# Patient Record
Sex: MALE | Race: WHITE | NOT HISPANIC OR LATINO | Employment: FULL TIME | URBAN - METROPOLITAN AREA
[De-identification: names, ages, dates, MRNs, and addresses within clinical notes are randomized per-mention and may not be internally consistent; named-entity substitution may affect disease eponyms.]

---

## 2019-02-11 ENCOUNTER — OFFICE VISIT (OUTPATIENT)
Dept: INTERNAL MEDICINE | Facility: CLINIC | Age: 49
End: 2019-02-11

## 2019-02-11 VITALS
DIASTOLIC BLOOD PRESSURE: 90 MMHG | SYSTOLIC BLOOD PRESSURE: 138 MMHG | HEART RATE: 62 BPM | BODY MASS INDEX: 24.08 KG/M2 | HEIGHT: 71 IN | WEIGHT: 172 LBS | TEMPERATURE: 98.2 F

## 2019-02-11 DIAGNOSIS — J45.30 MILD PERSISTENT ASTHMATIC BRONCHITIS WITHOUT COMPLICATION: ICD-10-CM

## 2019-02-11 DIAGNOSIS — R07.89 OTHER CHEST PAIN: Primary | ICD-10-CM

## 2019-02-11 PROBLEM — J30.89 PERENNIAL ALLERGIC RHINITIS WITH SEASONAL VARIATION: Status: ACTIVE | Noted: 2017-08-29

## 2019-02-11 PROBLEM — L08.1 ERYTHRASMA: Status: ACTIVE | Noted: 2017-08-29

## 2019-02-11 PROBLEM — J30.2 PERENNIAL ALLERGIC RHINITIS WITH SEASONAL VARIATION: Status: ACTIVE | Noted: 2017-08-29

## 2019-02-11 PROBLEM — J45.20 MILD INTERMITTENT ASTHMA: Status: ACTIVE | Noted: 2018-10-15

## 2019-02-11 PROBLEM — G43.909 MIGRAINE: Status: ACTIVE | Noted: 2019-02-11

## 2019-02-11 PROCEDURE — 99214 OFFICE O/P EST MOD 30 MIN: CPT | Performed by: PHYSICIAN ASSISTANT

## 2019-02-11 PROCEDURE — 93000 ELECTROCARDIOGRAM COMPLETE: CPT | Performed by: PHYSICIAN ASSISTANT

## 2019-02-11 RX ORDER — ALBUTEROL SULFATE 90 UG/1
2 AEROSOL, METERED RESPIRATORY (INHALATION) EVERY 4 HOURS PRN
COMMUNITY
End: 2019-08-21 | Stop reason: SDUPTHER

## 2019-02-11 RX ORDER — MONTELUKAST SODIUM 10 MG/1
10 TABLET ORAL NIGHTLY
Refills: 1 | COMMUNITY
Start: 2019-02-04 | End: 2019-12-10 | Stop reason: SDUPTHER

## 2019-02-11 RX ORDER — LOSARTAN POTASSIUM 25 MG/1
25 TABLET ORAL DAILY
COMMUNITY
End: 2020-04-09 | Stop reason: SDUPTHER

## 2019-02-11 RX ORDER — AZITHROMYCIN 250 MG/1
TABLET, FILM COATED ORAL
Qty: 6 TABLET | Refills: 0 | Status: SHIPPED | OUTPATIENT
Start: 2019-02-11 | End: 2019-03-28

## 2019-02-11 NOTE — PROGRESS NOTES
Patient Care Team:  Chel Blakely MD as PCP - General (Internal Medicine)    Chief Complaint;:   Chief Complaint   Patient presents with   • URI     cough-non productive, fatigue x 1 week        Subjective     HPI    Past Medical History:   Diagnosis Date   • Allergic rhinitis    • Childhood asthma     childhood   • Depression    • GERD (gastroesophageal reflux disease)    • Hypercholesteremia    • Hypertension    • Migraine        Social History     Socioeconomic History   • Marital status:      Spouse name: Not on file   • Number of children: Not on file   • Years of education: Not on file   • Highest education level: Not on file   Social Needs   • Financial resource strain: Not on file   • Food insecurity - worry: Not on file   • Food insecurity - inability: Not on file   • Transportation needs - medical: Not on file   • Transportation needs - non-medical: Not on file   Occupational History   • Not on file   Tobacco Use   • Smoking status: Never Smoker   • Smokeless tobacco: Never Used   Substance and Sexual Activity   • Alcohol use: Yes     Alcohol/week: 1.2 oz     Types: 2 Glasses of wine per week   • Drug use: Defer   • Sexual activity: Defer   Other Topics Concern   • Not on file   Social History Narrative   • Not on file       Allergies   Allergen Reactions   • Effexor [Venlafaxine] Paresthesia   • Remeron [Mirtazapine] Unknown (See Comments)     Unknown reaction       Review of Systems:     Review of Systems   Constitutional: Negative for chills, fatigue and fever.   HENT: Positive for congestion and sinus pressure. Negative for ear pain.    Respiratory: Positive for cough, shortness of breath and wheezing. Negative for chest tightness.    Cardiovascular: Negative for chest pain and palpitations.   Gastrointestinal: Negative for abdominal pain, blood in stool and constipation.   Skin: Negative for color change.   Allergic/Immunologic: Negative for environmental allergies.   Neurological:  "Positive for dizziness and headache. Negative for speech difficulty.   Psychiatric/Behavioral: Negative for decreased concentration. The patient is not nervous/anxious.        Vital Signs  Vitals:    02/11/19 1312   BP: 138/90   BP Location: Left arm   Patient Position: Sitting   Cuff Size: Adult   Pulse: 62   Temp: 98.2 °F (36.8 °C)   TempSrc: Temporal   Weight: 78 kg (172 lb)   Height: 180.3 cm (71\")   PainSc: 0-No pain         Current Outpatient Medications:   •  albuterol sulfate  (90 Base) MCG/ACT inhaler, Inhale 2 puffs Every 4 (Four) Hours As Needed for Wheezing., Disp: , Rfl:   •  losartan (COZAAR) 25 MG tablet, Take 25 mg by mouth Daily., Disp: , Rfl:   •  montelukast (SINGULAIR) 10 MG tablet, , Disp: , Rfl: 1    Physical Exam:    Physical Exam    Procedures      Assessment/Plan   Problem List Items Addressed This Visit     None        There are no Patient Instructions on file for this visit.    Plan of care reviewed with patient at the conclusion of today's visit. Education was provided regarding diagnosis, management, and any prescribed or recommended OTC medications.Patient verbalizes understanding of and agreement with management plan.     Clarice Champion PA-C  "

## 2019-02-11 NOTE — PROGRESS NOTES
"Patient Care Team:  Cehl Blakely MD as PCP - General (Internal Medicine)    Chief Complaint;:   Chief Complaint   Patient presents with   • URI     cough-non productive, fatigue x 1 week        Subjective     HPI  48-year-old male presents to the office today complaining of a one-week history of chest pressure and cough.  He states that the chest pressure has gotten significantly worse, \"feels like an elephant sitting on my chest\".  He's also had some associated left mid back discomfort.  He has been coughing and has had some wheezing.  He does use his albuterol inhaler occasionally.  No significant nasal congestion or drainage.  Past Medical History:   Diagnosis Date   • Allergic rhinitis    • Childhood asthma     childhood   • Depression    • GERD (gastroesophageal reflux disease)    • Hypercholesteremia    • Hypertension    • Migraine        Social History     Socioeconomic History   • Marital status:      Spouse name: Not on file   • Number of children: Not on file   • Years of education: Not on file   • Highest education level: Not on file   Social Needs   • Financial resource strain: Not on file   • Food insecurity - worry: Not on file   • Food insecurity - inability: Not on file   • Transportation needs - medical: Not on file   • Transportation needs - non-medical: Not on file   Occupational History   • Not on file   Tobacco Use   • Smoking status: Never Smoker   • Smokeless tobacco: Never Used   Substance and Sexual Activity   • Alcohol use: Yes     Alcohol/week: 1.2 oz     Types: 2 Glasses of wine per week   • Drug use: Defer   • Sexual activity: Defer   Other Topics Concern   • Not on file   Social History Narrative   • Not on file       Allergies   Allergen Reactions   • Effexor [Venlafaxine] Paresthesia   • Remeron [Mirtazapine] Unknown (See Comments)     Unknown reaction       Review of Systems:     Review of Systems   Constitutional: Positive for fatigue.   HENT: Positive for postnasal " "drip.    Respiratory: Positive for cough, chest tightness, shortness of breath and wheezing.    Cardiovascular: Positive for chest pain. Negative for palpitations and leg swelling.   Neurological: Negative.        Vital Signs  Vitals:    02/11/19 1312   BP: 138/90   BP Location: Left arm   Patient Position: Sitting   Cuff Size: Adult   Pulse: 62   Temp: 98.2 °F (36.8 °C)   TempSrc: Temporal   Weight: 78 kg (172 lb)   Height: 180.3 cm (71\")   PainSc: 0-No pain         Current Outpatient Medications:   •  albuterol sulfate  (90 Base) MCG/ACT inhaler, Inhale 2 puffs Every 4 (Four) Hours As Needed for Wheezing., Disp: , Rfl:   •  losartan (COZAAR) 25 MG tablet, Take 25 mg by mouth Daily. Unknown strength at this time, Disp: , Rfl:   •  montelukast (SINGULAIR) 10 MG tablet, , Disp: , Rfl: 1  •  azithromycin (ZITHROMAX Z-TRISHA) 250 MG tablet, Take 2 tablets the first day, then 1 tablet daily for 4 days., Disp: 6 tablet, Rfl: 0    Physical Exam:    Physical Exam   Constitutional: He is oriented to person, place, and time.   HENT:   Head: Normocephalic and atraumatic.   Right Ear: External ear normal.   Left Ear: External ear normal.   Mouth/Throat: Oropharynx is clear and moist.   Neck: Normal range of motion. Neck supple.   Cardiovascular: Normal rate, regular rhythm and normal heart sounds.   Pulmonary/Chest: Effort normal. He has wheezes.   Late expiratory wheezing.   Lymphadenopathy:     He has no cervical adenopathy.   Neurological: He is alert and oriented to person, place, and time.   Nursing note and vitals reviewed.        ECG 12 Lead  Date/Time: 2/11/2019 1:49 PM  Performed by: Clarice Champion PA-C  Authorized by: Clarice Champion PA-C   Comparison: compared with previous ECG from 8/29/2017  Similar to previous ECG  Rhythm: sinus bradycardia  Conduction: conduction normal  Clinical impression: normal ECG              Assessment/Plan   Problem List Items Addressed This Visit     None      Visit Diagnoses  "    Other chest pain    -  Primary    EKG normal    Relevant Orders    ECG 12 Lead    ECG 12 Lead    Mild persistent asthmatic bronchitis without complication        Relevant Medications    montelukast (SINGULAIR) 10 MG tablet    albuterol sulfate  (90 Base) MCG/ACT inhaler        Patient Instructions   EKG normal.     Zpak.   Breo 1 puff once a day.  Continue albuterol 2 puffs every 4-6 hours as needed for wheezing.  Call if not improving in the next 3-4 days or sooner if symptoms worsen.      Plan of care reviewed with patient at the conclusion of today's visit. Education was provided regarding diagnosis, management, and any prescribed or recommended OTC medications.Patient verbalizes understanding of and agreement with management plan.     Clarice Champion PA-C

## 2019-02-11 NOTE — PATIENT INSTRUCTIONS
EKG normal.     Zpak.   Breo 1 puff once a day.  Continue albuterol 2 puffs every 4-6 hours as needed for wheezing.  Call if not improving in the next 3-4 days or sooner if symptoms worsen.

## 2019-03-28 ENCOUNTER — OFFICE VISIT (OUTPATIENT)
Dept: INTERNAL MEDICINE | Facility: CLINIC | Age: 49
End: 2019-03-28

## 2019-03-28 VITALS
HEIGHT: 71 IN | SYSTOLIC BLOOD PRESSURE: 158 MMHG | HEART RATE: 96 BPM | DIASTOLIC BLOOD PRESSURE: 108 MMHG | BODY MASS INDEX: 23.52 KG/M2 | TEMPERATURE: 99.6 F | WEIGHT: 168 LBS

## 2019-03-28 DIAGNOSIS — I10 BENIGN ESSENTIAL HYPERTENSION: ICD-10-CM

## 2019-03-28 DIAGNOSIS — J02.9 PHARYNGITIS, UNSPECIFIED ETIOLOGY: Primary | ICD-10-CM

## 2019-03-28 PROCEDURE — 99214 OFFICE O/P EST MOD 30 MIN: CPT | Performed by: PHYSICIAN ASSISTANT

## 2019-03-28 RX ORDER — FAMOTIDINE 20 MG/1
1 TABLET, FILM COATED ORAL 2 TIMES DAILY PRN
COMMUNITY
Start: 2018-10-15 | End: 2022-02-23 | Stop reason: DRUGHIGH

## 2019-03-28 RX ORDER — FLUTICASONE PROPIONATE 110 UG/1
1 AEROSOL, METERED RESPIRATORY (INHALATION) EVERY 12 HOURS SCHEDULED
COMMUNITY
Start: 2018-10-15 | End: 2019-04-04 | Stop reason: SDUPTHER

## 2019-03-28 RX ORDER — AMOXICILLIN AND CLAVULANATE POTASSIUM 875; 125 MG/1; MG/1
1 TABLET, FILM COATED ORAL 2 TIMES DAILY
Qty: 20 TABLET | Refills: 0 | Status: SHIPPED | OUTPATIENT
Start: 2019-03-28 | End: 2019-04-07

## 2019-03-28 RX ORDER — FLUTICASONE PROPIONATE 50 MCG
1 SPRAY, SUSPENSION (ML) NASAL EVERY 12 HOURS SCHEDULED
COMMUNITY
Start: 2018-10-15 | End: 2019-04-04 | Stop reason: SDUPTHER

## 2019-03-28 NOTE — PROGRESS NOTES
Subjective   Perfecto Garcia is a 48 y.o. male.     Past Medical History:   Diagnosis Date   • Allergic rhinitis    • Childhood asthma     childhood   • Depression    • GERD (gastroesophageal reflux disease)    • Hypercholesteremia    • Hypertension    • Migraine       Past Surgical History:   Procedure Laterality Date   • CHOLECYSTECTOMY        Family History   Problem Relation Age of Onset   • Diabetes Mother    • Prostate cancer Father       Social History     Socioeconomic History   • Marital status:      Spouse name: Not on file   • Number of children: Not on file   • Years of education: Not on file   • Highest education level: Not on file   Tobacco Use   • Smoking status: Never Smoker   • Smokeless tobacco: Never Used   Substance and Sexual Activity   • Alcohol use: Yes     Alcohol/week: 1.2 oz     Types: 2 Glasses of wine per week   • Drug use: No   • Sexual activity: Defer      Allergies   Allergen Reactions   • Effexor [Venlafaxine] Paresthesia   • Remeron [Mirtazapine] Unknown (See Comments)     Unknown reaction      Immunization History   Administered Date(s) Administered   • Flu Vaccine Intradermal Quad 18-64YR 10/13/2018   • Flu Vaccine Quad PF >18YRS 11/07/2014, 11/30/2015, 11/11/2016, 10/13/2018   • Flu Vaccine Quad PF >36MO 08/29/2017   • Hepatitis A 10/30/2017   • Influenza TIV (IM) 12/13/2011   • Tdap 07/26/2013        HPI  Patient Active Problem List   Diagnosis   • Mild intermittent asthma   • Erythrasma   • Benign essential hypertension   • GERD without esophagitis   • Fatigue due to sleep pattern disturbance   • Snoring   • Migraine   • Cervicalgia   • Perennial allergic rhinitis with seasonal variation   • Hypercholesterolemia       Sore Throat    This is a new problem. The current episode started in the past 7 days. The problem has been gradually worsening. The maximum temperature recorded prior to his arrival was 100.4 - 100.9 F. The fever has been present for 3 to 4 days. Associated  symptoms include congestion, coughing, shortness of breath and trouble swallowing. Pertinent negatives include no abdominal pain or ear pain. He has tried gargles and acetaminophen for the symptoms. The treatment provided moderate relief.        Review of Systems   Constitutional: Positive for chills and fatigue. Negative for fever.   HENT: Positive for congestion, sinus pressure, sore throat, trouble swallowing and voice change. Negative for ear pain.    Respiratory: Positive for cough, chest tightness and shortness of breath. Negative for apnea and wheezing.    Cardiovascular: Negative for chest pain and palpitations.   Gastrointestinal: Negative for abdominal pain, blood in stool and constipation.   Endocrine: Negative for cold intolerance and heat intolerance.   Skin: Negative.  Negative for color change.   Allergic/Immunologic: Positive for environmental allergies.   Neurological: Positive for headache. Negative for dizziness and speech difficulty.   Psychiatric/Behavioral: Negative for decreased concentration. The patient is not nervous/anxious.        Objective   Physical Exam   Constitutional: He is oriented to person, place, and time. He appears well-developed and well-nourished.   HENT:   Head: Normocephalic and atraumatic.   Right Ear: External ear normal.   Left Ear: External ear normal.   Mouth/Throat: Posterior oropharyngeal erythema present.   Eyes: Conjunctivae and EOM are normal. Pupils are equal, round, and reactive to light.   Neck: Normal range of motion. Neck supple.   Cardiovascular: Normal rate, regular rhythm, normal heart sounds and intact distal pulses.   Pulmonary/Chest: Effort normal and breath sounds normal.   Abdominal: Soft. Bowel sounds are normal.   Neurological: He is alert and oriented to person, place, and time.   Skin: Skin is warm and dry.   Psychiatric: He has a normal mood and affect. His behavior is normal. Judgment and thought content normal.   Nursing note and vitals  reviewed.      Procedures    Assessment/Plan   Problem List Items Addressed This Visit        Cardiovascular and Mediastinum    Benign essential hypertension    Relevant Medications    losartan (COZAAR) 25 MG tablet      Other Visit Diagnoses     Pharyngitis, unspecified etiology    -  Primary    Relevant Medications    amoxicillin-clavulanate (AUGMENTIN) 875-125 MG per tablet                 Pt's social, medical, and family history reviewed and updated.    Plan of care reviewed with patient at the conclusion of today's visit. Education was provided regarding diagnosis, management and any prescribed or recommended OTC medications. Patient verbalizes understanding of and agreement with management plan.     Return if symptoms worsen or fail to improve.

## 2019-03-29 PROBLEM — J01.10 ACUTE NON-RECURRENT FRONTAL SINUSITIS: Status: ACTIVE | Noted: 2019-03-29

## 2019-04-04 ENCOUNTER — OFFICE VISIT (OUTPATIENT)
Dept: INTERNAL MEDICINE | Facility: CLINIC | Age: 49
End: 2019-04-04

## 2019-04-04 VITALS
HEART RATE: 74 BPM | DIASTOLIC BLOOD PRESSURE: 88 MMHG | WEIGHT: 168 LBS | HEIGHT: 71 IN | SYSTOLIC BLOOD PRESSURE: 120 MMHG | BODY MASS INDEX: 23.52 KG/M2

## 2019-04-04 DIAGNOSIS — I10 BENIGN ESSENTIAL HYPERTENSION: ICD-10-CM

## 2019-04-04 DIAGNOSIS — E78.00 HYPERCHOLESTEROLEMIA: ICD-10-CM

## 2019-04-04 DIAGNOSIS — J30.89 PERENNIAL ALLERGIC RHINITIS WITH SEASONAL VARIATION: Primary | ICD-10-CM

## 2019-04-04 DIAGNOSIS — J01.10 ACUTE NON-RECURRENT FRONTAL SINUSITIS: ICD-10-CM

## 2019-04-04 DIAGNOSIS — J30.2 PERENNIAL ALLERGIC RHINITIS WITH SEASONAL VARIATION: Primary | ICD-10-CM

## 2019-04-04 DIAGNOSIS — M54.2 CERVICALGIA: ICD-10-CM

## 2019-04-04 DIAGNOSIS — J45.20 MILD INTERMITTENT ASTHMA WITHOUT COMPLICATION: ICD-10-CM

## 2019-04-04 PROBLEM — L08.1 ERYTHRASMA: Status: RESOLVED | Noted: 2017-08-29 | Resolved: 2019-04-04

## 2019-04-04 PROCEDURE — 96372 THER/PROPH/DIAG INJ SC/IM: CPT | Performed by: INTERNAL MEDICINE

## 2019-04-04 PROCEDURE — 99214 OFFICE O/P EST MOD 30 MIN: CPT | Performed by: INTERNAL MEDICINE

## 2019-04-04 RX ORDER — FLUTICASONE PROPIONATE 50 MCG
1 SPRAY, SUSPENSION (ML) NASAL 2 TIMES DAILY
Qty: 1 BOTTLE | Refills: 5 | Status: SHIPPED | OUTPATIENT
Start: 2019-04-04 | End: 2019-11-15 | Stop reason: SDUPTHER

## 2019-04-04 RX ORDER — TRIAMCINOLONE ACETONIDE 40 MG/ML
80 INJECTION, SUSPENSION INTRA-ARTICULAR; INTRAMUSCULAR ONCE
Status: COMPLETED | OUTPATIENT
Start: 2019-04-04 | End: 2019-04-04

## 2019-04-04 RX ORDER — TRIAMCINOLONE ACETONIDE 40 MG/ML
40 INJECTION, SUSPENSION INTRA-ARTICULAR; INTRAMUSCULAR ONCE
Status: DISCONTINUED | OUTPATIENT
Start: 2019-04-04 | End: 2019-04-04

## 2019-04-04 RX ORDER — FLUTICASONE PROPIONATE 110 UG/1
1 AEROSOL, METERED RESPIRATORY (INHALATION) EVERY 12 HOURS SCHEDULED
Qty: 1 INHALER | Refills: 5 | Status: SHIPPED | OUTPATIENT
Start: 2019-04-04 | End: 2019-04-13 | Stop reason: RX

## 2019-04-04 RX ADMIN — TRIAMCINOLONE ACETONIDE 80 MG: 40 INJECTION, SUSPENSION INTRA-ARTICULAR; INTRAMUSCULAR at 12:39

## 2019-04-04 NOTE — PATIENT INSTRUCTIONS
Hypertension, continue taking losartan daily.  Monitor the blood pressure some at home or the drugstore.  Goal is to keep it less than 130/85 all the time.  Avoid salt in the diet.  Try to get in more walking and exercise.    For cough and allergy and asthma symptoms, Kenalog steroid injection given today.  Finish the course of Augmentin.  Infection does seem to be improving with that.  Use the fluticasone (Flonase) nasal spray twice a day every day.  Continue using the [place] pot.  May use Mucinex twice a day to liquefy the drainage so it will drain better.  Use the fluticasone (Flovent) HFA  inhaler for the lungs twice a day every day.  Continue using the albuterol rescue inhaler any time you need it for cough/wheeze/chest congestion.  Continue taking Singulair tablet every evening.  He will call the allergy office and [place] to see about starting allergy injections.  Coming down the allergies will help a lot with the asthma symptoms.    For neck pain, continue to pay attention to good posture, especially when working at the computer.  Do some neck stretches throughout the workday.  Use moist heat to relax the muscles in the evening.    For mild elevation of LDL (bad cholesterol) and triglycerides, avoiding fats and sugars in the diet and trying to get more walking and exercise will help.  Bronchospasm, Adult  Bronchospasm is a tightening of the airways going into the lungs. During an episode, it may be harder to breathe. You may cough, and you may make a whistling sound when you breathe (wheeze).  This condition often affects people with asthma.  What are the causes?  This condition is caused by swelling and irritation in the airways. It can be triggered by:  · An infection (common).  · Seasonal allergies.  · An allergic reaction.  · Exercise.  · Irritants. These include pollution, cigarette smoke, strong odors, aerosol sprays, and paint fumes.  · Weather changes. Winds increase molds and pollens in the air.  Cold air may cause swelling.  · Stress and emotional upset.    What are the signs or symptoms?  Symptoms of this condition include:  · Wheezing. If the episode was triggered by an allergy, wheezing may start right away or hours later.  · Nighttime coughing.  · Frequent or severe coughing with a simple cold.  · Chest tightness.  · Shortness of breath.  · Decreased ability to exercise.    How is this diagnosed?  This condition is usually diagnosed with a review of your medical history and a physical exam. Tests, such as lung function tests, are sometimes done to look for other conditions. The need for a chest X-ray depends on where the wheezing occurs and whether it is the first time you have wheezed.  How is this treated?  This condition may be treated with:  · Inhaled medicines. These open up the airways and help you breathe. They can be taken with an inhaler or a nebulizer device.  · Corticosteroid medicines. These may be given for severe bronchospasm, usually when it is associated with asthma.  · Avoiding triggers, such as irritants, infection, or allergies.    Follow these instructions at home:  Medicines  · Take over-the-counter and prescription medicines only as told by your health care provider.  · If you need to use an inhaler or nebulizer to take your medicine, ask your health care provider to explain how to use it correctly. If you were given a spacer, always use it with your inhaler.  Lifestyle  · Reduce the number of triggers in your home. To do this:  ? Change your heating and air conditioning filter at least once a month.  ? Limit your use of fireplaces and wood stoves.  ? Do not smoke. Do not allow smoking in your home.  ? Avoid using perfumes and fragrances.  ? Get rid of pests, such as roaches and mice, and their droppings.  ? Remove any mold from your home.  ? Keep your house clean and dust free. Use unscented cleaning products.  ? Replace carpet with wood, tile, or vinyl kyle. Carpet can trap  dander and dust.  ? Use allergy-proof pillows, mattress covers, and box spring covers.  ? Wash bed sheets and blankets every week in hot water. Dry them in a dryer.  ? Use blankets that are made of polyester or cotton.  ? Wash your hands often.  ? Do not allow pets in your bedroom.  · Avoid breathing in cold air when you exercise.  General instructions  · Have a plan for seeking medical care. Know when to call your health care provider and local emergency services, and where to get emergency care.  · Stay up to date on your immunizations.  · When you have an episode of bronchospasm, stay calm. Try to relax and breathe more slowly.  · If you have asthma, make sure you have an asthma action plan.  · Keep all follow-up visits as told by your health care provider. This is important.  Contact a health care provider if:  · You have muscle aches.  · You have chest pain.  · The mucus that you cough up (sputum) changes from clear or white to yellow, green, gray, or bloody.  · You have a fever.  · Your sputum gets thicker.  Get help right away if:  · Your wheezing and coughing get worse, even after you take your prescribed medicines.  · It gets even harder to breathe.  · You develop severe chest pain.  Summary  · Bronchospasm is a tightening of the airways going into the lungs.  · During an episode of bronchospasm, you may have a harder time breathing. You may cough and make a whistling sound when you breathe (wheeze).  · Avoid exposure to triggers such as smoke, dust, mold, animal dander, and fragrances.  · When you have an episode of bronchospasm, stay calm. Try to relax and breathe more slowly.  This information is not intended to replace advice given to you by your health care provider. Make sure you discuss any questions you have with your health care provider.  Document Released: 12/20/2004 Document Revised: 12/14/2017 Document Reviewed: 12/14/2017  [employer] Interactive Patient Education © 2019 [employer]    Allergy  Shots, Adult  Allergy shots, also known as allergen immunotherapy, is a treatment that helps reduce allergy symptoms or conditions such as:  · Sneezing.  · Itchy, watery eyes.  · A runny, stuffy nose.  · Asthma.    The treatment may benefit people who are allergic to any of these substances:  · Pollen from grasses, trees, plants, and weeds.  · Insect venom.  · Animal dander.  · House dust mites.  · Molds.    The treatment is not done for food allergies.  During your allergy immunotherapy treatments, the substance that you are allergic to (allergen) will be injected under your skin.  · At first, only a little bit of the substance will be given.  · Over time, the amount will slowly be increased.  · The treatments allow your body to build up a tolerance (immunity) to the allergen and create proteins called antibodies that block the effect that the allergen has on your body.    Most people start by getting shots 1-3 times a week for 3-6 months, then they continue to get maintenance shots about one time per month for life. Some people can stop getting shots after 3-5 years. It may be necessary to stop this treatment if:  · The shots do not work for you.  · You start taking certain medicines that interact with the shots.  · You miss many appointments for your shots.    Allergy tablets given under the tongue is another treatment option that may work for certain types of allergies.  Tell a health care provider about:  · All medicines you are taking, including vitamins, herbs, eye drops, creams, and over-the-counter medicines. If you are taking certain types of heart medicine (beta blockers), you may not be able to receive allergy shots.  · Any blood disorders you have.  · Any medical conditions you have, especially asthma.  · Whether you are pregnant or may be pregnant.  What are the risks?  This treatment can cause side effects. The most common side effects affect the injection area and include mild redness and swelling.  They often go away on their own.  Serious reactions, while rare, can happen. A serious reaction that spreads throughout the body (systemic reaction) may require immediate treatment by a health care provider. Reactions usually happen within the first 30 minutes after the shots (injections), but they can occur later.  What happens before the procedure?  · Let your health care provider know if you are not feeling well the day of the injection.  What happens during the procedure?  · The allergen will be injected into your skin.  The procedure may vary among health care providers and hospitals.  What happens after the procedure?  · You will need to stay at the clinic for up to 30 minutes so a health care provider can be sure that you do not have serious side effects.  · The shots will begin to work shortly after you begin treatment, but your allergy symptoms may not improve for 3-12 months.  · Get help right away if you have any symptoms of a systemic reaction. These include:  ? Itchy, red, swollen areas of skin (hives) or rash.  ? Itchy eyes, nose, or throat.  ? Runny nose or nasal congestion.  ? Coughing or trouble breathing.  ? Wheezing.  ? Chest tightness.  ? Swelling of the throat.  ? Nausea.  ? Dizziness.  · Keep all follow-up visits as told by your health care provider. This is important.  Summary  · Allergen immunotherapy is a treatment that helps reduce allergy symptoms.  · Before receiving injections, tell a health care provider about all medicines you are taking.  · The most common side effects of allergy shots are mild redness and swelling at the injection site.  · There is a risk of a serious allergic reaction. Seek medical care right away if you develop wheezing, chest tightness, or any trouble with breathing.  This information is not intended to replace advice given to you by your health care provider. Make sure you discuss any questions you have with your health care provider.  Document Released:  09/26/2009 Document Revised: 11/06/2017 Document Reviewed: 11/06/2017  [employer] Interactive Patient Education © 2019 [employer]

## 2019-04-04 NOTE — PROGRESS NOTES
"[facility]     Perfecto Garcia  1970   [id number]      Patient Care Team:  Chel Blakely MD as PCP - General (Internal Medicine)    Chief Complaint;:   Chief Complaint   Patient presents with   • Sore Throat     follow-up            HPI  Patient is a 48 y.o. male presents with cough . Onset of symptoms was gradual starting 2 weeks ago.  Chronicity acute. Severity moderate to severe and persistent.  Symptoms are associated with short of breath with exertion, headache, fatigue, PND, blowing nose a lot, ears congested.Drainage is clear.Sore throat some better.  Pertinent negatives no fever/chills.   Symptoms are aggravated by lying down.   Symptoms improve with Augmentin has helped some.  He started it on 3/28/2019 when he saw [facility].      He thinks he got the inhalers confused and has been using \"the red one\" regularly and not been using the flovent.     HPI #2  R side of neck been a bit tight and painful with turning head.  He does do some neck stretches.  He has started trying to keep better posture while working.Moist heat helps.      CHRONIC CONDITIONS  Taking losartan daily. BP's usually controlled.Not walking or exercising much over winter. He also avoids exercise sometimes because of shortness of breath.    Past Medical History:   Diagnosis Date   • Allergic rhinitis    • Childhood asthma     childhood   • Depression    • Erythrasma 8/29/2017   • GERD (gastroesophageal reflux disease)    • Hypercholesteremia    • Hypertension    • Low back pain    • Migraine        Past Surgical History:   Procedure Laterality Date   • CHOLECYSTECTOMY         Family History   Problem Relation Age of Onset   • Diabetes Mother    • Prostate cancer Father        Social History     Socioeconomic History   • Marital status:      Spouse name: Not on file   • Number of children: Not on file   • Years of education: Not on file   • Highest education level: Not on file   Tobacco Use   • Smoking status: " "Never Smoker   • Smokeless tobacco: Never Used   Substance and Sexual Activity   • Alcohol use: Yes     Alcohol/week: 1.2 oz     Types: 2 Glasses of wine per week   • Drug use: No   • Sexual activity: Defer       Allergies   Allergen Reactions   • Effexor [Venlafaxine] Paresthesia   • Remeron [Mirtazapine] Unknown (See Comments)     Unknown reaction       Review of Systems:     Review of Systems   Constitutional: Positive for fatigue. Negative for appetite change, chills, diaphoresis, fever and unexpected weight gain.   HENT: Positive for congestion, postnasal drip, sinus pressure and sore throat. Negative for ear pain, hearing loss, nosebleeds, rhinorrhea, trouble swallowing and voice change.    Eyes: Negative for visual disturbance.   Respiratory: Positive for cough, chest tightness, shortness of breath and wheezing.    Cardiovascular: Negative for chest pain, palpitations and leg swelling.   Gastrointestinal: Negative for abdominal pain, blood in stool, constipation, diarrhea, nausea, vomiting and GERD.   Endocrine: Negative for cold intolerance and heat intolerance.   Genitourinary: Negative for urinary incontinence, dysuria, frequency, hematuria and urgency.   Musculoskeletal: Positive for neck pain. Negative for arthralgias, back pain, gait problem, joint swelling and myalgias.   Skin: Negative for color change and rash.   Neurological: Negative for dizziness, seizures, syncope, weakness, light-headedness and numbness.   Hematological: Negative for adenopathy. Does not bruise/bleed easily.   Psychiatric/Behavioral: Negative for behavioral problems, sleep disturbance, suicidal ideas and depressed mood. The patient is not nervous/anxious.        Vital Signs  Vitals:    04/04/19 1137   BP: 120/88   BP Location: Left arm   Patient Position: Sitting   Cuff Size: Adult   Pulse: 74   Weight: 76.2 kg (168 lb)   Height: 180.3 cm (70.98\")     Body mass index is 23.44 kg/m².    Current Outpatient Medications:   •  " albuterol sulfate  (90 Base) MCG/ACT inhaler, Inhale 2 puffs Every 4 (Four) Hours As Needed for Wheezing., Disp: , Rfl:   •  amoxicillin-clavulanate (AUGMENTIN) 875-125 MG per tablet, Take 1 tablet by mouth 2 (Two) Times a Day for 10 days., Disp: 20 tablet, Rfl: 0  •  famotidine (PEPCID) 20 MG tablet, Take 1 tablet by mouth 2 (Two) Times a Day As Needed., Disp: , Rfl:   •  fluticasone (FLONASE) 50 MCG/ACT nasal spray, 1 spray into the nostril(s) as directed by provider Every 12 (Twelve) Hours., Disp: , Rfl:   •  fluticasone (FLOVENT HFA) 110 MCG/ACT inhaler, Inhale 1 puff Every 12 (Twelve) Hours., Disp: , Rfl:   •  losartan (COZAAR) 25 MG tablet, Take 25 mg by mouth Daily. Unknown strength at this time, Disp: , Rfl:   •  montelukast (SINGULAIR) 10 MG tablet, , Disp: , Rfl: 1  No current facility-administered medications for this visit.     Physical Exam:    Physical Exam   Constitutional: He is oriented to person, place, and time. He appears well-developed and well-nourished.   HENT:   Head: Normocephalic.   Right Ear: Tympanic membrane and ear canal normal.   Left Ear: Tympanic membrane and ear canal normal.   Nose: Mucosal edema and congestion present.   Mouth/Throat: Uvula swelling present. Posterior oropharyngeal erythema present.   Both nares with erythema and clear drainage.   Eyes: Conjunctivae and EOM are normal. Pupils are equal, round, and reactive to light.   Neck: Normal range of motion. Neck supple. No thyromegaly present.   Cardiovascular: Normal rate, regular rhythm, normal heart sounds and intact distal pulses.   Pulmonary/Chest: Effort normal. He has wheezes in the right upper field, the right middle field, the right lower field, the left upper field, the left middle field and the left lower field.   Wheezing on expiration   Musculoskeletal: Normal range of motion. He exhibits no edema.   Lymphadenopathy:     He has no cervical adenopathy.   Neurological: He is alert and oriented to person,  place, and time.   Psychiatric: He has a normal mood and affect. Thought content normal.   Nursing note and vitals reviewed.       Results Review:    None    CMP:     HbA1c:  No results found for: HGBA1C  Microalbumin:  No results found for: MICROALBUR, POCMALB, POCCREAT  Lipid Panel  No results found for: CHOL, TRIG, HDL, LDL, AST, ALT    Medication Review: Medications reviewed and noted    Assessment/Plan:    Perfecto was seen today for sore throat.    Diagnoses and all orders for this visit:    Perennial allergic rhinitis with seasonal variation  -     Discontinue: triamcinolone acetonide (KENALOG-40) injection 40 mg  -     triamcinolone acetonide (KENALOG-40) injection 80 mg    Acute non-recurrent frontal sinusitis    Mild intermittent asthma without complication    Benign essential hypertension  -     Comprehensive Metabolic Panel; Future  -     Urinalysis With Microscopic - Urine, Clean Catch; Future  -     CBC & Differential; Future  -     Microalbumin / Creatinine Urine Ratio - Urine, Clean Catch; Future    Cervicalgia    Hypercholesterolemia  -     TSH; Future  -     Lipid Panel; Future    Other orders  -     fluticasone (FLOVENT HFA) 110 MCG/ACT inhaler; Inhale 1 puff Every 12 (Twelve) Hours.  -     fluticasone (FLONASE) 50 MCG/ACT nasal spray; 1 spray into the nostril(s) as directed by provider 2 (Two) Times a Day.        Patient Instructions   Hypertension, continue taking losartan daily.  Monitor the blood pressure some at home or the drugstore.  Goal is to keep it less than 130/85 all the time.  Avoid salt in the diet.  Try to get in more walking and exercise.    For cough and allergy and asthma symptoms, Kenalog steroid injection given today.  Finish the course of Augmentin.  Infection does seem to be improving with that.  Use the fluticasone (Flonase) nasal spray twice a day every day.  Continue using the Mattie pot.  May use Mucinex twice a day to liquefy the drainage so it will drain better.  Use the  fluticasone (Flovent) HFA  inhaler for the lungs twice a day every day.  Continue using the albuterol rescue inhaler any time you need it for cough/wheeze/chest congestion.  Continue taking Singulair tablet every evening.  He will call the allergy office and [place] to see about starting allergy injections.  Coming down the allergies will help a lot with the asthma symptoms.    For neck pain, continue to pay attention to good posture, especially when working at the computer.  Do some neck stretches throughout the workday.  Use moist heat to relax the muscles in the evening.    For mild elevation of LDL (bad cholesterol) and triglycerides, avoiding fats and sugars in the diet and trying to get more walking and exercise will help.  Bronchospasm, Adult  Bronchospasm is a tightening of the airways going into the lungs. During an episode, it may be harder to breathe. You may cough, and you may make a whistling sound when you breathe (wheeze).  This condition often affects people with asthma.  What are the causes?  This condition is caused by swelling and irritation in the airways. It can be triggered by:  · An infection (common).  · Seasonal allergies.  · An allergic reaction.  · Exercise.  · Irritants. These include pollution, cigarette smoke, strong odors, aerosol sprays, and paint fumes.  · Weather changes. Winds increase molds and pollens in the air. Cold air may cause swelling.  · Stress and emotional upset.    What are the signs or symptoms?  Symptoms of this condition include:  · Wheezing. If the episode was triggered by an allergy, wheezing may start right away or hours later.  · Nighttime coughing.  · Frequent or severe coughing with a simple cold.  · Chest tightness.  · Shortness of breath.  · Decreased ability to exercise.    How is this diagnosed?  This condition is usually diagnosed with a review of your medical history and a physical exam. Tests, such as lung function tests, are sometimes done to look  for other conditions. The need for a chest X-ray depends on where the wheezing occurs and whether it is the first time you have wheezed.  How is this treated?  This condition may be treated with:  · Inhaled medicines. These open up the airways and help you breathe. They can be taken with an inhaler or a nebulizer device.  · Corticosteroid medicines. These may be given for severe bronchospasm, usually when it is associated with asthma.  · Avoiding triggers, such as irritants, infection, or allergies.    Follow these instructions at home:  Medicines  · Take over-the-counter and prescription medicines only as told by your health care provider.  · If you need to use an inhaler or nebulizer to take your medicine, ask your health care provider to explain how to use it correctly. If you were given a spacer, always use it with your inhaler.  Lifestyle  · Reduce the number of triggers in your home. To do this:  ? Change your heating and air conditioning filter at least once a month.  ? Limit your use of fireplaces and wood stoves.  ? Do not smoke. Do not allow smoking in your home.  ? Avoid using perfumes and fragrances.  ? Get rid of pests, such as roaches and mice, and their droppings.  ? Remove any mold from your home.  ? Keep your house clean and dust free. Use unscented cleaning products.  ? Replace carpet with wood, tile, or vinyl kyle. Carpet can trap dander and dust.  ? Use allergy-proof pillows, mattress covers, and box spring covers.  ? Wash bed sheets and blankets every week in hot water. Dry them in a dryer.  ? Use blankets that are made of polyester or cotton.  ? Wash your hands often.  ? Do not allow pets in your bedroom.  · Avoid breathing in cold air when you exercise.  General instructions  · Have a plan for seeking medical care. Know when to call your health care provider and local emergency services, and where to get emergency care.  · Stay up to date on your immunizations.  · When you have an episode  of bronchospasm, stay calm. Try to relax and breathe more slowly.  · If you have asthma, make sure you have an asthma action plan.  · Keep all follow-up visits as told by your health care provider. This is important.  Contact a health care provider if:  · You have muscle aches.  · You have chest pain.  · The mucus that you cough up (sputum) changes from clear or white to yellow, green, gray, or bloody.  · You have a fever.  · Your sputum gets thicker.  Get help right away if:  · Your wheezing and coughing get worse, even after you take your prescribed medicines.  · It gets even harder to breathe.  · You develop severe chest pain.  Summary  · Bronchospasm is a tightening of the airways going into the lungs.  · During an episode of bronchospasm, you may have a harder time breathing. You may cough and make a whistling sound when you breathe (wheeze).  · Avoid exposure to triggers such as smoke, dust, mold, animal dander, and fragrances.  · When you have an episode of bronchospasm, stay calm. Try to relax and breathe more slowly.  This information is not intended to replace advice given to you by your health care provider. Make sure you discuss any questions you have with your health care provider.  Document Released: 12/20/2004 Document Revised: 12/14/2017 Document Reviewed: 12/14/2017  [employer] Interactive Patient Education © 2019 [employer]    Allergy Shots, Adult  Allergy shots, also known as allergen immunotherapy, is a treatment that helps reduce allergy symptoms or conditions such as:  · Sneezing.  · Itchy, watery eyes.  · A runny, stuffy nose.  · Asthma.    The treatment may benefit people who are allergic to any of these substances:  · Pollen from grasses, trees, plants, and weeds.  · Insect venom.  · Animal dander.  · House dust mites.  · Molds.    The treatment is not done for food allergies.  During your allergy immunotherapy treatments, the substance that you are allergic to (allergen) will be injected  under your skin.  · At first, only a little bit of the substance will be given.  · Over time, the amount will slowly be increased.  · The treatments allow your body to build up a tolerance (immunity) to the allergen and create proteins called antibodies that block the effect that the allergen has on your body.    Most people start by getting shots 1-3 times a week for 3-6 months, then they continue to get maintenance shots about one time per month for life. Some people can stop getting shots after 3-5 years. It may be necessary to stop this treatment if:  · The shots do not work for you.  · You start taking certain medicines that interact with the shots.  · You miss many appointments for your shots.    Allergy tablets given under the tongue is another treatment option that may work for certain types of allergies.  Tell a health care provider about:  · All medicines you are taking, including vitamins, herbs, eye drops, creams, and over-the-counter medicines. If you are taking certain types of heart medicine (beta blockers), you may not be able to receive allergy shots.  · Any blood disorders you have.  · Any medical conditions you have, especially asthma.  · Whether you are pregnant or may be pregnant.  What are the risks?  This treatment can cause side effects. The most common side effects affect the injection area and include mild redness and swelling. They often go away on their own.  Serious reactions, while rare, can happen. A serious reaction that spreads throughout the body (systemic reaction) may require immediate treatment by a health care provider. Reactions usually happen within the first 30 minutes after the shots (injections), but they can occur later.  What happens before the procedure?  · Let your health care provider know if you are not feeling well the day of the injection.  What happens during the procedure?  · The allergen will be injected into your skin.  The procedure may vary among health care  providers and hospitals.  What happens after the procedure?  · You will need to stay at the clinic for up to 30 minutes so a health care provider can be sure that you do not have serious side effects.  · The shots will begin to work shortly after you begin treatment, but your allergy symptoms may not improve for 3-12 months.  · Get help right away if you have any symptoms of a systemic reaction. These include:  ? Itchy, red, swollen areas of skin (hives) or rash.  ? Itchy eyes, nose, or throat.  ? Runny nose or nasal congestion.  ? Coughing or trouble breathing.  ? Wheezing.  ? Chest tightness.  ? Swelling of the throat.  ? Nausea.  ? Dizziness.  · Keep all follow-up visits as told by your health care provider. This is important.  Summary  · Allergen immunotherapy is a treatment that helps reduce allergy symptoms.  · Before receiving injections, tell a health care provider about all medicines you are taking.  · The most common side effects of allergy shots are mild redness and swelling at the injection site.  · There is a risk of a serious allergic reaction. Seek medical care right away if you develop wheezing, chest tightness, or any trouble with breathing.  This information is not intended to replace advice given to you by your health care provider. Make sure you discuss any questions you have with your health care provider.  Document Released: 09/26/2009 Document Revised: 11/06/2017 Document Reviewed: 11/06/2017  [employer] Interactive Patient Education © 2019 [employer]        Plan of care reviewed with patient at the conclusion of today's visit. Education was provided regarding diagnosis, management, and any prescribed or recommended OTC medications.Patient verbalizes understanding of and agreement with management plan.         Chel Blakely MD

## 2019-04-12 ENCOUNTER — TELEPHONE (OUTPATIENT)
Dept: INTERNAL MEDICINE | Facility: CLINIC | Age: 49
End: 2019-04-12

## 2019-04-12 ENCOUNTER — OFFICE VISIT (OUTPATIENT)
Dept: INTERNAL MEDICINE | Facility: CLINIC | Age: 49
End: 2019-04-12

## 2019-04-12 VITALS
BODY MASS INDEX: 23.52 KG/M2 | DIASTOLIC BLOOD PRESSURE: 92 MMHG | HEIGHT: 71 IN | WEIGHT: 168 LBS | TEMPERATURE: 98.7 F | HEART RATE: 68 BPM | SYSTOLIC BLOOD PRESSURE: 136 MMHG

## 2019-04-12 DIAGNOSIS — H66.001 ACUTE SUPPURATIVE OTITIS MEDIA OF RIGHT EAR WITHOUT SPONTANEOUS RUPTURE OF TYMPANIC MEMBRANE, RECURRENCE NOT SPECIFIED: Primary | ICD-10-CM

## 2019-04-12 PROCEDURE — 99213 OFFICE O/P EST LOW 20 MIN: CPT | Performed by: NURSE PRACTITIONER

## 2019-04-12 RX ORDER — AZITHROMYCIN 250 MG/1
TABLET, FILM COATED ORAL
Qty: 6 TABLET | Refills: 0 | Status: SHIPPED | OUTPATIENT
Start: 2019-04-12 | End: 2019-05-10

## 2019-04-12 NOTE — TELEPHONE ENCOUNTER
PHARMACY CALLED STATING THAT PT'S FLOVENT  MCG  1 PUFF Q 12 HOURS IDS NOT COVERED BY INSURANCE  SO EITHER IT NEEDS A PA OR SWITCHED TO ARNUITY ELLIPTICAL ,ESSENEX,OR QVAR

## 2019-04-12 NOTE — PROGRESS NOTES
Perfecto Garcia  1970  [id number]  Patient Care Team:  Chel Blakely MD as PCP - General (Internal Medicine)    Perfecto Garcia is a pleasant 48 y.o. male who presents for evaluation of Earache (ear pain on the right side and patient hears ringing )        Chief Complaint   Patient presents with   • Earache     ear pain on the right side and patient hears ringing        HPI:   Recent upper respiratory infections, treated with Augmentin and steroids, has had several visits here this spring.  Is on Singulair, has albuterol inhaler, using Flonase.  Has not gotten significant relief from these symptoms and reports spring allergies had never been this bad for him.  About 2 days ago developed right ear pain, significant, never had an earache like this before.  No fever    Past Medical History:   Diagnosis Date   • Allergic rhinitis    • Childhood asthma     childhood   • Depression    • Erythrasma 8/29/2017   • GERD (gastroesophageal reflux disease)    • Hypercholesteremia    • Hypertension    • Low back pain    • Migraine        Past Surgical History:   Procedure Laterality Date   • CHOLECYSTECTOMY         Family History   Problem Relation Age of Onset   • Diabetes Mother    • Prostate cancer Father        Social History     Tobacco Use   Smoking Status Never Smoker   Smokeless Tobacco Never Used       Allergies   Allergen Reactions   • Effexor [Venlafaxine] Paresthesia   • Remeron [Mirtazapine] Unknown (See Comments)     Unknown reaction       Review of Systems   Constitutional: Positive for fatigue. Negative for chills and fever.   HENT: Positive for congestion, ear pain and tinnitus. Negative for sinus pressure.    Respiratory: Positive for cough and wheezing. Negative for chest tightness and shortness of breath.    Cardiovascular: Negative for chest pain and palpitations.   Gastrointestinal: Negative for abdominal pain, blood in stool and constipation.   Skin: Negative for color change.   Allergic/Immunologic:  "Positive for environmental allergies.   Neurological: Positive for headache. Negative for dizziness and speech difficulty.   Psychiatric/Behavioral: Positive for decreased concentration. The patient is not nervous/anxious.        Vitals:    04/12/19 1057   BP: 136/92   BP Location: Left arm   Patient Position: Sitting   Cuff Size: Adult   Pulse: 68   Temp: 98.7 °F (37.1 °C)   TempSrc: Temporal   Weight: 76.2 kg (168 lb)   Height: 180.3 cm (70.98\")   PainSc:   8   PainLoc: Ear         Current Outpatient Medications:   •  albuterol sulfate  (90 Base) MCG/ACT inhaler, Inhale 2 puffs Every 4 (Four) Hours As Needed for Wheezing., Disp: , Rfl:   •  famotidine (PEPCID) 20 MG tablet, Take 1 tablet by mouth 2 (Two) Times a Day As Needed., Disp: , Rfl:   •  fluticasone (FLONASE) 50 MCG/ACT nasal spray, 1 spray into the nostril(s) as directed by provider 2 (Two) Times a Day., Disp: 1 bottle, Rfl: 5  •  fluticasone (FLOVENT HFA) 110 MCG/ACT inhaler, Inhale 1 puff Every 12 (Twelve) Hours., Disp: 1 inhaler, Rfl: 5  •  losartan (COZAAR) 25 MG tablet, Take 25 mg by mouth Daily. Unknown strength at this time, Disp: , Rfl:   •  montelukast (SINGULAIR) 10 MG tablet, , Disp: , Rfl: 1  •  azithromycin (ZITHROMAX Z-TRISHA) 250 MG tablet, Take 2 tablets the first day, then 1 tablet daily for 4 days., Disp: 6 tablet, Rfl: 0    Physical Exam   Constitutional: He appears well-developed and well-nourished.   HENT:   Head: Normocephalic and atraumatic.   Right Ear: External ear normal. No drainage. Tympanic membrane is erythematous and bulging. Tympanic membrane mobility is abnormal. A middle ear effusion is present.   Left Ear: External ear normal. A middle ear effusion is present.   Mouth/Throat: Oropharynx is clear and moist.   Eyes: Conjunctivae and EOM are normal.   Neck: Normal range of motion. Neck supple.   Cardiovascular: Normal rate, regular rhythm and normal heart sounds.   Pulmonary/Chest: Effort normal and breath sounds " normal.   Abdominal: Soft. Bowel sounds are normal.   Musculoskeletal: Normal range of motion.   Lymphadenopathy:     He has no cervical adenopathy.   Neurological: He is alert.   Skin: Skin is warm and dry.   Psychiatric: He has a normal mood and affect. His behavior is normal. Thought content normal.       Results Review:    None    Assessment/Plan:    Problem List Items Addressed This Visit     None      Visit Diagnoses     Acute suppurative otitis media of right ear without spontaneous rupture of tympanic membrane, recurrence not specified    -  Primary    Relevant Medications    azithromycin (ZITHROMAX Z-TRISHA) 250 MG tablet      Continue regular medicines, add Xyzal.    Plan of care reviewed with patient at the conclusion of today's visit. Education was provided regarding diagnosis, management and any prescribed or recommended OTC medications.  Patient verbalizes understanding of and agreement with management plan.    Return if symptoms worsen or fail to improve.    *Note that portions of this note were completed with a voice recognition program.  Efforts were made to edit the dictation but occasionally words are transcribed.    MARISELA Mcintyre

## 2019-04-13 NOTE — TELEPHONE ENCOUNTER
I sent qvar to use twice a day instead of flovent.  (It took his pharmacy 8 days to let us know not covered)

## 2019-05-10 ENCOUNTER — OFFICE VISIT (OUTPATIENT)
Dept: INTERNAL MEDICINE | Facility: CLINIC | Age: 49
End: 2019-05-10

## 2019-05-10 VITALS
WEIGHT: 165 LBS | HEART RATE: 80 BPM | DIASTOLIC BLOOD PRESSURE: 64 MMHG | HEIGHT: 71 IN | BODY MASS INDEX: 23.1 KG/M2 | SYSTOLIC BLOOD PRESSURE: 112 MMHG | TEMPERATURE: 97.6 F

## 2019-05-10 DIAGNOSIS — J30.89 PERENNIAL ALLERGIC RHINITIS WITH SEASONAL VARIATION: ICD-10-CM

## 2019-05-10 DIAGNOSIS — J30.2 PERENNIAL ALLERGIC RHINITIS WITH SEASONAL VARIATION: ICD-10-CM

## 2019-05-10 DIAGNOSIS — K21.9 GERD WITHOUT ESOPHAGITIS: ICD-10-CM

## 2019-05-10 DIAGNOSIS — I10 BENIGN ESSENTIAL HYPERTENSION: ICD-10-CM

## 2019-05-10 DIAGNOSIS — J45.20 MILD INTERMITTENT ASTHMA WITHOUT COMPLICATION: ICD-10-CM

## 2019-05-10 DIAGNOSIS — J01.11 ACUTE RECURRENT FRONTAL SINUSITIS: Primary | ICD-10-CM

## 2019-05-10 PROCEDURE — 99214 OFFICE O/P EST MOD 30 MIN: CPT | Performed by: INTERNAL MEDICINE

## 2019-05-10 RX ORDER — AMOXICILLIN AND CLAVULANATE POTASSIUM 875; 125 MG/1; MG/1
1 TABLET, FILM COATED ORAL 2 TIMES DAILY
Qty: 20 TABLET | Refills: 0 | Status: SHIPPED | OUTPATIENT
Start: 2019-05-10 | End: 2019-08-23

## 2019-05-10 NOTE — PATIENT INSTRUCTIONS
For acute frontal sinusitis with headache and fever and chills, take Augmentin generic twice a day for 10 days.  Continue aggressive treatment of allergies with the Flonase nasal spray twice a day and montelukast every evening.    Continue asthma treatment with Qvar inhaler 2 puffs twice a day and montelukast tablet every evening and albuterol rescue inhaler as needed and famotidine twice a day to keep reflux under control since that can exacerbate asthma symptoms.    For hypertension, continue taking low-dose losartan daily.  Continue to avoid salt in the diet.  Try to get some daily walking and exercise.

## 2019-05-10 NOTE — PROGRESS NOTES
[facility]     Perfecto Garcia  1970   [id number]      Patient Care Team:  Chel Blakely MD as PCP - General (Internal Medicine)    Chief Complaint;:   Chief Complaint   Patient presents with   • Sinus Problem     x 1 week pain pressure patient states it hurts to bend over            HPI  Patient is a 48 y.o. male presents with fever/chills. Onset of symptoms was abrupt starting 9 days ago.  Chronicityacute. Severity moderate.  Symptoms are associated with headache center of forehead with nausea and mild photophobia. Pertinent negatives not a lot of nasal congestion, no increase in dyspnea .   Symptoms are aggravated by nothing.   Symptoms improve with advil helps some with fever and sweats.  Context wife and child have had sinus infection.       CHRONIC CONDITIONS  Blood pressure has been controlled on low-dose losartan.    He is trying to eat less fats and sugars.    Asthma and shortness of breath with exertion may be some better.  He is using the Qvar inhaler twice a day except he did run out of it a few days ago but plans to go refill it.  He is also using the albuterol inhaler as needed and it does help.  He is using the montelukast every evening.  He is taking the Pepcid twice a day to decrease reflux symptoms.  He is also using fluticasone nasal spray twice a day to decrease allergy symptoms.    Past Medical History:   Diagnosis Date   • Allergic rhinitis    • Childhood asthma     childhood   • Depression    • Erythrasma 8/29/2017   • GERD (gastroesophageal reflux disease)    • Hypercholesteremia    • Hypertension    • Low back pain    • Migraine        Past Surgical History:   Procedure Laterality Date   • CHOLECYSTECTOMY         Family History   Problem Relation Age of Onset   • Diabetes Mother    • Prostate cancer Father        Social History     Socioeconomic History   • Marital status:      Spouse name: Not on file   • Number of children: Not on file   • Years of education:  "Not on file   • Highest education level: Not on file   Tobacco Use   • Smoking status: Never Smoker   • Smokeless tobacco: Never Used   Substance and Sexual Activity   • Alcohol use: Yes     Alcohol/week: 1.2 oz     Types: 2 Glasses of wine per week   • Drug use: No   • Sexual activity: Defer       Allergies   Allergen Reactions   • Effexor [Venlafaxine] Paresthesia   • Remeron [Mirtazapine] Unknown (See Comments)     Unknown reaction       Review of Systems:     Review of Systems   Constitutional: Positive for fatigue and fever. Negative for chills.   HENT: Positive for postnasal drip and sinus pressure. Negative for congestion, ear pain and sore throat.    Respiratory: Positive for cough and shortness of breath. Negative for chest tightness and wheezing.    Cardiovascular: Negative for chest pain, palpitations and leg swelling.   Gastrointestinal: Negative for abdominal pain, blood in stool, constipation and GERD.   Skin: Negative for color change.   Allergic/Immunologic: Negative for environmental allergies.   Neurological: Positive for headache. Negative for dizziness, speech difficulty, weakness and numbness.   Psychiatric/Behavioral: Negative for decreased concentration. The patient is not nervous/anxious.        Vital Signs  Vitals:    05/10/19 0936   BP: 112/64   BP Location: Left arm   Patient Position: Sitting   Cuff Size: Adult   Pulse: 80   Temp: 97.6 °F (36.4 °C)   TempSrc: Temporal   Weight: 74.8 kg (165 lb)   Height: 180.3 cm (70.98\")     Body mass index is 23.02 kg/m².      Current Outpatient Medications:   •  albuterol sulfate  (90 Base) MCG/ACT inhaler, Inhale 2 puffs Every 4 (Four) Hours As Needed for Wheezing., Disp: , Rfl:   •  beclomethasone (QVAR) 40 MCG/ACT inhaler, Inhale 2 puffs 2 (Two) Times a Day., Disp: 1 inhaler, Rfl: 11  •  famotidine (PEPCID) 20 MG tablet, Take 1 tablet by mouth 2 (Two) Times a Day As Needed., Disp: , Rfl:   •  fluticasone (FLONASE) 50 MCG/ACT nasal spray, 1 " spray into the nostril(s) as directed by provider 2 (Two) Times a Day., Disp: 1 bottle, Rfl: 5  •  losartan (COZAAR) 25 MG tablet, Take 25 mg by mouth Daily. Unknown strength at this time, Disp: , Rfl:   •  montelukast (SINGULAIR) 10 MG tablet, Take 10 mg by mouth Every Night., Disp: , Rfl: 1  •  amoxicillin-clavulanate (AUGMENTIN) 875-125 MG per tablet, Take 1 tablet by mouth 2 (Two) Times a Day., Disp: 20 tablet, Rfl: 0    Physical Exam:    Physical Exam   Constitutional: He is oriented to person, place, and time. He appears well-developed and well-nourished.   HENT:   Head: Normocephalic.   Right Ear: Tympanic membrane and ear canal normal.   Left Ear: Tympanic membrane and ear canal normal.   Nose: Mucosal edema and congestion present.   Mouth/Throat: Uvula is midline and mucous membranes are normal. Uvula swelling present.   Postnasal drainage is noted in the posterior oropharynx.   Eyes: Conjunctivae and EOM are normal. Pupils are equal, round, and reactive to light.   Neck: Normal range of motion. Neck supple. No thyromegaly present.   Cardiovascular: Normal rate, regular rhythm, normal heart sounds and intact distal pulses.   Pulmonary/Chest: Effort normal and breath sounds normal. He has no wheezes. He has no rales.   Musculoskeletal: Normal range of motion. He exhibits no edema.   Lymphadenopathy:     He has no cervical adenopathy.   Neurological: He is alert and oriented to person, place, and time.   Psychiatric: He has a normal mood and affect. Thought content normal.   Nursing note and vitals reviewed.       ACE III MINI        Results Review:    None    CMP:     HbA1c:  No results found for: HGBA1C  Microalbumin:  No results found for: MICROALBUR, POCMALB, POCCREAT  Lipid Panel  No results found for: CHOL, TRIG, HDL, LDL, AST, ALT    Medication Review: Medications reviewed and noted    Problem List Items Addressed This Visit        Cardiovascular and Mediastinum    Benign essential hypertension     Relevant Medications    losartan (COZAAR) 25 MG tablet       Respiratory    Mild intermittent asthma    Relevant Medications    montelukast (SINGULAIR) 10 MG tablet    albuterol sulfate  (90 Base) MCG/ACT inhaler    beclomethasone (QVAR) 40 MCG/ACT inhaler    Perennial allergic rhinitis with seasonal variation    Relevant Medications    fluticasone (FLONASE) 50 MCG/ACT nasal spray    triamcinolone acetonide (KENALOG-40) injection 80 mg (Completed)       Digestive    GERD without esophagitis    Relevant Medications    famotidine (PEPCID) 20 MG tablet      Other Visit Diagnoses     Acute recurrent frontal sinusitis    -  Primary    Relevant Medications    amoxicillin-clavulanate (AUGMENTIN) 875-125 MG per tablet           Patient Instructions   For acute frontal sinusitis with headache and fever and chills, take Augmentin generic twice a day for 10 days.  Continue aggressive treatment of allergies with the Flonase nasal spray twice a day and montelukast every evening.    Continue asthma treatment with Qvar inhaler 2 puffs twice a day and montelukast tablet every evening and albuterol rescue inhaler as needed and famotidine twice a day to keep reflux under control since that can exacerbate asthma symptoms.    For hypertension, continue taking low-dose losartan daily.  Continue to avoid salt in the diet.  Try to get some daily walking and exercise.      Plan of care reviewed with patient at the conclusion of today's visit. Education was provided regarding diagnosis, management, and any prescribed or recommended OTC medications.Patient verbalizes understanding of and agreement with management plan.         Chel Blakely MD

## 2019-07-29 DIAGNOSIS — B35.4: Primary | ICD-10-CM

## 2019-07-29 RX ORDER — CLOTRIMAZOLE AND BETAMETHASONE DIPROPIONATE 10; .64 MG/G; MG/G
CREAM TOPICAL 2 TIMES DAILY
Qty: 45 G | Refills: 0 | Status: SHIPPED | OUTPATIENT
Start: 2019-07-29 | End: 2019-11-15

## 2019-08-23 ENCOUNTER — OFFICE VISIT (OUTPATIENT)
Dept: INTERNAL MEDICINE | Facility: CLINIC | Age: 49
End: 2019-08-23

## 2019-08-23 VITALS
WEIGHT: 164 LBS | DIASTOLIC BLOOD PRESSURE: 86 MMHG | TEMPERATURE: 97.7 F | BODY MASS INDEX: 22.96 KG/M2 | SYSTOLIC BLOOD PRESSURE: 140 MMHG | HEIGHT: 71 IN | HEART RATE: 60 BPM

## 2019-08-23 DIAGNOSIS — J45.20 MILD INTERMITTENT ASTHMA WITHOUT COMPLICATION: Primary | ICD-10-CM

## 2019-08-23 PROCEDURE — 96372 THER/PROPH/DIAG INJ SC/IM: CPT | Performed by: PHYSICIAN ASSISTANT

## 2019-08-23 PROCEDURE — 99213 OFFICE O/P EST LOW 20 MIN: CPT | Performed by: PHYSICIAN ASSISTANT

## 2019-08-23 RX ORDER — TRIAMCINOLONE ACETONIDE 40 MG/ML
60 INJECTION, SUSPENSION INTRA-ARTICULAR; INTRAMUSCULAR ONCE
Status: COMPLETED | OUTPATIENT
Start: 2019-08-23 | End: 2019-08-23

## 2019-08-23 RX ADMIN — TRIAMCINOLONE ACETONIDE 60 MG: 40 INJECTION, SUSPENSION INTRA-ARTICULAR; INTRAMUSCULAR at 13:19

## 2019-08-23 NOTE — PROGRESS NOTES
"Patient Care Team:  Chel Blakely MD as PCP - General (Internal Medicine)    Chief Complaint;:   Chief Complaint   Patient presents with   • Cough     x 3 week non productive        Subjective     HPI  Flare of asthma the last few weeks.   Fall allergies are bad and he is remodeling his house-a lot of dust.   Nonproductive cough, no significant nasal drainage.   No fever or chills.  No SOB.   Wheezing worse in early morning.   He rarely uses albuterol, he is using Qvar.  Past Medical History:   Diagnosis Date   • Allergic rhinitis    • Childhood asthma     childhood   • Depression    • Erythrasma 8/29/2017   • GERD (gastroesophageal reflux disease)    • Hypercholesteremia    • Hypertension    • Low back pain    • Migraine        Social History     Socioeconomic History   • Marital status:      Spouse name: Not on file   • Number of children: Not on file   • Years of education: Not on file   • Highest education level: Not on file   Tobacco Use   • Smoking status: Never Smoker   • Smokeless tobacco: Never Used   Substance and Sexual Activity   • Alcohol use: Yes     Alcohol/week: 1.2 oz     Types: 2 Glasses of wine per week   • Drug use: No   • Sexual activity: Defer       Allergies   Allergen Reactions   • Effexor [Venlafaxine] Paresthesia   • Remeron [Mirtazapine] Unknown (See Comments)     Unknown reaction       Review of Systems:     Review of Systems   Constitutional: Negative for chills and fever.   HENT: Positive for congestion.    Respiratory: Positive for cough and wheezing. Negative for shortness of breath.        Vital Signs  Vitals:    08/23/19 1302 08/23/19 1320   BP: 152/100 140/86   BP Location: Left arm    Patient Position: Sitting    Cuff Size: Adult    Pulse: 60    Temp: 97.7 °F (36.5 °C)    TempSrc: Temporal    Weight: 74.4 kg (164 lb)    Height: 180.3 cm (70.98\")    PainSc: 0-No pain          Current Outpatient Medications:   •  beclomethasone (QVAR) 40 MCG/ACT inhaler, Inhale 2 puffs 2 " (Two) Times a Day., Disp: 1 inhaler, Rfl: 11  •  clotrimazole-betamethasone (LOTRISONE) 1-0.05 % cream, Apply  topically to the appropriate area as directed 2 (Two) Times a Day. Tinea on arms, Disp: 45 g, Rfl: 0  •  famotidine (PEPCID) 20 MG tablet, Take 1 tablet by mouth 2 (Two) Times a Day As Needed., Disp: , Rfl:   •  fluticasone (FLONASE) 50 MCG/ACT nasal spray, 1 spray into the nostril(s) as directed by provider 2 (Two) Times a Day., Disp: 1 bottle, Rfl: 5  •  losartan (COZAAR) 25 MG tablet, Take 25 mg by mouth Daily. Unknown strength at this time, Disp: , Rfl:   •  montelukast (SINGULAIR) 10 MG tablet, Take 10 mg by mouth Every Night., Disp: , Rfl: 1  •  PROAIR  (90 Base) MCG/ACT inhaler, inhale 2 puffs by mouth every 4 to 6 hours if needed, Disp: 8.5 g, Rfl: 0  •  Fluticasone Furoate-Vilanterol (BREO ELLIPTA) 200-25 MCG/INH inhaler, Inhale 1 puff Daily., Disp: 2 inhaler, Rfl: 0  No current facility-administered medications for this visit.     Physical Exam:    Physical Exam   Constitutional: He is oriented to person, place, and time. He appears well-developed and well-nourished.   HENT:   Head: Normocephalic and atraumatic.   Mouth/Throat: Oropharynx is clear and moist.   Neck: Normal range of motion. Neck supple.   Cardiovascular: Normal rate, regular rhythm and normal heart sounds.   Pulmonary/Chest: Effort normal and breath sounds normal.   Lymphadenopathy:     He has no cervical adenopathy.   Neurological: He is alert and oriented to person, place, and time.   Nursing note and vitals reviewed.      Procedures      Assessment/Plan   Problem List Items Addressed This Visit        Respiratory    Mild intermittent asthma - Primary    Relevant Medications    montelukast (SINGULAIR) 10 MG tablet    beclomethasone (QVAR) 40 MCG/ACT inhaler    PROAIR  (90 Base) MCG/ACT inhaler    triamcinolone acetonide (KENALOG-40) injection 60 mg (Completed) (Start on 8/23/2019  2:00 PM)    Fluticasone  Furoate-Vilanterol (BREO ELLIPTA) 200-25 MCG/INH inhaler        Patient Instructions   Kenalog 60 mg IM.  Continue albuterol as needed.   Stop Qvar, try Breo 200/25 once a day.   Call if not improving next week.      Plan of care reviewed with patient at the conclusion of today's visit. Education was provided regarding diagnosis, management, and any prescribed or recommended OTC medications.Patient verbalizes understanding of and agreement with management plan.     Clarice Champion PA-C

## 2019-08-23 NOTE — PATIENT INSTRUCTIONS
Kenalog 60 mg IM.  Continue albuterol as needed.   Stop Qvar, try Breo 200/25 once a day.   Call if not improving next week.

## 2019-11-15 ENCOUNTER — OFFICE VISIT (OUTPATIENT)
Dept: INTERNAL MEDICINE | Facility: CLINIC | Age: 49
End: 2019-11-15

## 2019-11-15 VITALS
DIASTOLIC BLOOD PRESSURE: 88 MMHG | BODY MASS INDEX: 22.4 KG/M2 | HEART RATE: 68 BPM | TEMPERATURE: 99.5 F | WEIGHT: 160 LBS | SYSTOLIC BLOOD PRESSURE: 146 MMHG | HEIGHT: 71 IN

## 2019-11-15 DIAGNOSIS — J06.9 ACUTE URI: ICD-10-CM

## 2019-11-15 DIAGNOSIS — J45.20 MILD INTERMITTENT ASTHMA WITHOUT COMPLICATION: ICD-10-CM

## 2019-11-15 DIAGNOSIS — J30.2 PERENNIAL ALLERGIC RHINITIS WITH SEASONAL VARIATION: ICD-10-CM

## 2019-11-15 DIAGNOSIS — J01.41 ACUTE RECURRENT PANSINUSITIS: ICD-10-CM

## 2019-11-15 DIAGNOSIS — J02.9 SORE THROAT: Primary | ICD-10-CM

## 2019-11-15 DIAGNOSIS — J30.89 PERENNIAL ALLERGIC RHINITIS WITH SEASONAL VARIATION: ICD-10-CM

## 2019-11-15 LAB
EXPIRATION DATE: NORMAL
INTERNAL CONTROL: NORMAL
Lab: NORMAL
S PYO RRNA THROAT QL PROBE: NEGATIVE

## 2019-11-15 PROCEDURE — 87651 STREP A DNA AMP PROBE: CPT | Performed by: NURSE PRACTITIONER

## 2019-11-15 PROCEDURE — 99213 OFFICE O/P EST LOW 20 MIN: CPT | Performed by: NURSE PRACTITIONER

## 2019-11-15 RX ORDER — FLUTICASONE PROPIONATE 50 MCG
1 SPRAY, SUSPENSION (ML) NASAL 2 TIMES DAILY
Qty: 1 BOTTLE | Refills: 5 | Status: SHIPPED | OUTPATIENT
Start: 2019-11-15 | End: 2020-09-22 | Stop reason: SDUPTHER

## 2019-11-15 RX ORDER — GUAIFENESIN 600 MG/1
1200 TABLET, EXTENDED RELEASE ORAL 2 TIMES DAILY
Qty: 30 TABLET | Refills: 1 | Status: SHIPPED | OUTPATIENT
Start: 2019-11-15 | End: 2021-10-04

## 2019-11-15 RX ORDER — AMOXICILLIN AND CLAVULANATE POTASSIUM 875; 125 MG/1; MG/1
1 TABLET, FILM COATED ORAL 2 TIMES DAILY
Qty: 14 TABLET | Refills: 0 | Status: SHIPPED | OUTPATIENT
Start: 2019-11-15 | End: 2019-12-10

## 2019-11-15 NOTE — PATIENT INSTRUCTIONS
Try using your albuterol inhaler before you begin your bike riding.  Keep it with you to use after you arrive at work if you need it.  If you continue to have symptoms of chest tightness and coughing throughout the day or are needing this on a regular basis we need to consider going back to the daily steroid inhalers.  Referral made to [facility].    Restart Flonase nasal spray daily.  Use Mucinex daily for cough and congestion.

## 2019-11-15 NOTE — PROGRESS NOTES
Perfecto Garcia  1970  [id number]  Patient Care Team:  Chel Blakely MD as PCP - General (Internal Medicine)    Perfecto Garcia is a pleasant 49 y.o. male who presents for evaluation of Sore Throat (x 1 day) and Sinus Problem (sneezing, cough, congestion )      Chief Complaint   Patient presents with   • Sore Throat     x 1 day   • Sinus Problem     sneezing, cough, congestion        HPI:   Sinus sx this week but sig worse last night with sore throat, congesiton  Teeth hurt, temporal pressure, clear nasal drainge, thick , no fever    Asthma: Chest tightness and coughing.  He has been riding his bike to work now at [employer] from home for the last few months and notices much chest tightness and coughing especially since the weather is gotten cold.  He does not want to use inhalers daily has several at home but really tries to avoid medications.  He does not take his allergy medications anymore.    Past Medical History:   Diagnosis Date   • Allergic rhinitis    • Childhood asthma     childhood   • Depression    • Erythrasma 8/29/2017   • GERD (gastroesophageal reflux disease)    • Hypercholesteremia    • Hypertension    • Low back pain    • Migraine      Past Surgical History:   Procedure Laterality Date   • CHOLECYSTECTOMY       Family History   Problem Relation Age of Onset   • Diabetes Mother    • Prostate cancer Father      Social History     Tobacco Use   Smoking Status Never Smoker   Smokeless Tobacco Never Used     Allergies   Allergen Reactions   • Effexor [Venlafaxine] Paresthesia   • Remeron [Mirtazapine] Unknown (See Comments)     Unknown reaction       Current Outpatient Medications:   •  famotidine (PEPCID) 20 MG tablet, Take 1 tablet by mouth 2 (Two) Times a Day As Needed., Disp: , Rfl:   •  losartan (COZAAR) 25 MG tablet, Take 25 mg by mouth Daily., Disp: , Rfl:   •  montelukast (SINGULAIR) 10 MG tablet, Take 10 mg by mouth Every Night., Disp: , Rfl: 1  •  PROAIR  (90 Base) MCG/ACT inhaler, inhale  "2 puffs by mouth every 4 to 6 hours if needed, Disp: 8.5 g, Rfl: 0  •  amoxicillin-clavulanate (AUGMENTIN) 875-125 MG per tablet, Take 1 tablet by mouth 2 (Two) Times a Day., Disp: 14 tablet, Rfl: 0  •  fluticasone (FLONASE) 50 MCG/ACT nasal spray, 1 spray into the nostril(s) as directed by provider 2 (Two) Times a Day., Disp: 1 bottle, Rfl: 5  •  Fluticasone Furoate-Vilanterol (BREO ELLIPTA) 200-25 MCG/INH inhaler, Inhale 1 puff Daily., Disp: 2 inhaler, Rfl: 0  •  guaiFENesin (MUCINEX) 600 MG 12 hr tablet, Take 2 tablets by mouth 2 (Two) Times a Day., Disp: 30 tablet, Rfl: 1    Review of Systems   Constitutional: Positive for fatigue. Negative for chills and fever.   HENT: Positive for congestion and sinus pressure. Negative for ear pain.    Respiratory: Positive for cough, shortness of breath and wheezing. Negative for chest tightness.    Cardiovascular: Negative for chest pain and palpitations.   Gastrointestinal: Negative for abdominal pain, blood in stool and constipation.   Skin: Negative for color change.   Allergic/Immunologic: Positive for environmental allergies.   Neurological: Positive for headache. Negative for dizziness and speech difficulty.   Psychiatric/Behavioral: Negative for decreased concentration. The patient is not nervous/anxious.      /88 (BP Location: Left arm, Patient Position: Sitting, Cuff Size: Adult)   Pulse 68   Temp 99.5 °F (37.5 °C) (Temporal)   Ht 180.3 cm (70.98\")   Wt 72.6 kg (160 lb)   BMI 22.33 kg/m²     Physical Exam   Constitutional: He appears well-developed and well-nourished.   HENT:   Head: Normocephalic and atraumatic.   Right Ear: External ear normal.   Left Ear: External ear normal.   Mouth/Throat: Oropharyngeal exudate and posterior oropharyngeal erythema present.   Eyes: Conjunctivae and EOM are normal.   Neck: Normal range of motion. Neck supple.   Cardiovascular: Normal rate, regular rhythm and normal heart sounds.   Pulmonary/Chest: Effort normal and " breath sounds normal.   Musculoskeletal: Normal range of motion.   Neurological: He is alert.   Skin: Skin is warm and dry.   Psychiatric: He has a normal mood and affect. His behavior is normal. Thought content normal.       Results Review:  I reviewed the patient's new clinical results.    Assessment/Plan:  Perfecto was seen today for sore throat and sinus problem.    Diagnoses and all orders for this visit:    Sore throat  -     POCT Strep A, molecular  -     Ambulatory Referral to Allergy    Acute URI    Mild intermittent asthma without complication    Perennial allergic rhinitis with seasonal variation  -     fluticasone (FLONASE) 50 MCG/ACT nasal spray; 1 spray into the nostril(s) as directed by provider 2 (Two) Times a Day.    Acute recurrent pansinusitis  -     amoxicillin-clavulanate (AUGMENTIN) 875-125 MG per tablet; Take 1 tablet by mouth 2 (Two) Times a Day.  -     guaiFENesin (MUCINEX) 600 MG 12 hr tablet; Take 2 tablets by mouth 2 (Two) Times a Day.       Patient Instructions   Try using your albuterol inhaler before you begin your bike riding.  Keep it with you to use after you arrive at work if you need it.  If you continue to have symptoms of chest tightness and coughing throughout the day or are needing this on a regular basis we need to consider going back to the daily steroid inhalers.  Referral made to [facility].    Restart Flonase nasal spray daily.  Use Mucinex daily for cough and congestion.      Plan of care reviewed with patient at the conclusion of today's visit. Education was provided regarding diagnosis, management and any prescribed or recommended OTC medications.  Patient verbalizes understanding of and agreement with management plan.    Return if symptoms worsen or fail to improve.    *Note that portions of this note were completed with a voice recognition program.  Efforts were made to edit the dictation but occasionally words are transcribed.    MARISELA Mcintyre

## 2019-12-10 ENCOUNTER — LAB (OUTPATIENT)
Dept: LAB | Facility: HOSPITAL | Age: 49
End: 2019-12-10

## 2019-12-10 ENCOUNTER — OFFICE VISIT (OUTPATIENT)
Dept: INTERNAL MEDICINE | Facility: CLINIC | Age: 49
End: 2019-12-10

## 2019-12-10 VITALS
HEART RATE: 66 BPM | WEIGHT: 163 LBS | DIASTOLIC BLOOD PRESSURE: 98 MMHG | HEIGHT: 71 IN | SYSTOLIC BLOOD PRESSURE: 138 MMHG | BODY MASS INDEX: 22.82 KG/M2

## 2019-12-10 DIAGNOSIS — J30.89 PERENNIAL ALLERGIC RHINITIS WITH SEASONAL VARIATION: ICD-10-CM

## 2019-12-10 DIAGNOSIS — I10 BENIGN ESSENTIAL HYPERTENSION: ICD-10-CM

## 2019-12-10 DIAGNOSIS — J45.901 MODERATE ASTHMA WITH ACUTE EXACERBATION, UNSPECIFIED WHETHER PERSISTENT: ICD-10-CM

## 2019-12-10 DIAGNOSIS — R05.9 COUGH: Primary | ICD-10-CM

## 2019-12-10 DIAGNOSIS — J30.2 PERENNIAL ALLERGIC RHINITIS WITH SEASONAL VARIATION: ICD-10-CM

## 2019-12-10 DIAGNOSIS — E78.00 HYPERCHOLESTEROLEMIA: ICD-10-CM

## 2019-12-10 DIAGNOSIS — J01.10 SUBACUTE FRONTAL SINUSITIS: ICD-10-CM

## 2019-12-10 LAB
ALBUMIN SERPL-MCNC: 4.3 G/DL (ref 3.5–5.2)
ALBUMIN/GLOB SERPL: 1.2 G/DL
ALP SERPL-CCNC: 93 U/L (ref 39–117)
ALT SERPL W P-5'-P-CCNC: 14 U/L (ref 1–41)
ANION GAP SERPL CALCULATED.3IONS-SCNC: 12.2 MMOL/L (ref 5–15)
AST SERPL-CCNC: 16 U/L (ref 1–40)
BACTERIA UR QL AUTO: NORMAL /HPF
BASOPHILS # BLD AUTO: 0.05 10*3/MM3 (ref 0–0.2)
BASOPHILS NFR BLD AUTO: 0.8 % (ref 0–1.5)
BILIRUB SERPL-MCNC: 0.4 MG/DL (ref 0.2–1.2)
BILIRUB UR QL STRIP: NEGATIVE
BUN BLD-MCNC: 8 MG/DL (ref 6–20)
BUN/CREAT SERPL: 8.8 (ref 7–25)
CALCIUM SPEC-SCNC: 9.7 MG/DL (ref 8.6–10.5)
CHLORIDE SERPL-SCNC: 105 MMOL/L (ref 98–107)
CHOLEST SERPL-MCNC: 187 MG/DL (ref 0–200)
CLARITY UR: CLEAR
CO2 SERPL-SCNC: 25.8 MMOL/L (ref 22–29)
COLOR UR: YELLOW
CREAT BLD-MCNC: 0.91 MG/DL (ref 0.76–1.27)
DEPRECATED RDW RBC AUTO: 35.6 FL (ref 37–54)
EOSINOPHIL # BLD AUTO: 0.54 10*3/MM3 (ref 0–0.4)
EOSINOPHIL NFR BLD AUTO: 8.2 % (ref 0.3–6.2)
ERYTHROCYTE [DISTWIDTH] IN BLOOD BY AUTOMATED COUNT: 11.8 % (ref 12.3–15.4)
GFR SERPL CREATININE-BSD FRML MDRD: 107 ML/MIN/1.73
GFR SERPL CREATININE-BSD FRML MDRD: 88 ML/MIN/1.73
GLOBULIN UR ELPH-MCNC: 3.6 GM/DL
GLUCOSE BLD-MCNC: 85 MG/DL (ref 65–99)
GLUCOSE UR STRIP-MCNC: NEGATIVE MG/DL
HCT VFR BLD AUTO: 44.2 % (ref 37.5–51)
HDLC SERPL-MCNC: 44 MG/DL (ref 40–60)
HGB BLD-MCNC: 14.8 G/DL (ref 13–17.7)
HGB UR QL STRIP.AUTO: NEGATIVE
HYALINE CASTS UR QL AUTO: NORMAL /LPF
IMM GRANULOCYTES # BLD AUTO: 0.02 10*3/MM3 (ref 0–0.05)
IMM GRANULOCYTES NFR BLD AUTO: 0.3 % (ref 0–0.5)
KETONES UR QL STRIP: NEGATIVE
LDLC SERPL CALC-MCNC: 117 MG/DL (ref 0–100)
LDLC/HDLC SERPL: 2.66 {RATIO}
LEUKOCYTE ESTERASE UR QL STRIP.AUTO: NEGATIVE
LYMPHOCYTES # BLD AUTO: 1.55 10*3/MM3 (ref 0.7–3.1)
LYMPHOCYTES NFR BLD AUTO: 23.5 % (ref 19.6–45.3)
MCH RBC QN AUTO: 28.2 PG (ref 26.6–33)
MCHC RBC AUTO-ENTMCNC: 33.5 G/DL (ref 31.5–35.7)
MCV RBC AUTO: 84.4 FL (ref 79–97)
MONOCYTES # BLD AUTO: 0.42 10*3/MM3 (ref 0.1–0.9)
MONOCYTES NFR BLD AUTO: 6.4 % (ref 5–12)
NEUTROPHILS # BLD AUTO: 4.01 10*3/MM3 (ref 1.7–7)
NEUTROPHILS NFR BLD AUTO: 60.8 % (ref 42.7–76)
NITRITE UR QL STRIP: NEGATIVE
NRBC BLD AUTO-RTO: 0 /100 WBC (ref 0–0.2)
PH UR STRIP.AUTO: 5.5 [PH] (ref 5–8)
PLATELET # BLD AUTO: 253 10*3/MM3 (ref 140–450)
PMV BLD AUTO: 11.6 FL (ref 6–12)
POTASSIUM BLD-SCNC: 4.3 MMOL/L (ref 3.5–5.2)
PROT SERPL-MCNC: 7.9 G/DL (ref 6–8.5)
PROT UR QL STRIP: NEGATIVE
RBC # BLD AUTO: 5.24 10*6/MM3 (ref 4.14–5.8)
RBC # UR: NORMAL /HPF
REF LAB TEST METHOD: NORMAL
SODIUM BLD-SCNC: 143 MMOL/L (ref 136–145)
SP GR UR STRIP: 1.02 (ref 1–1.03)
SQUAMOUS #/AREA URNS HPF: NORMAL /HPF
TRIGL SERPL-MCNC: 130 MG/DL (ref 0–150)
TSH SERPL DL<=0.05 MIU/L-ACNC: 0.95 UIU/ML (ref 0.27–4.2)
UROBILINOGEN UR QL STRIP: NORMAL
VLDLC SERPL-MCNC: 26 MG/DL (ref 5–40)
WBC NRBC COR # BLD: 6.59 10*3/MM3 (ref 3.4–10.8)
WBC UR QL AUTO: NORMAL /HPF

## 2019-12-10 PROCEDURE — 85025 COMPLETE CBC W/AUTO DIFF WBC: CPT

## 2019-12-10 PROCEDURE — 84443 ASSAY THYROID STIM HORMONE: CPT

## 2019-12-10 PROCEDURE — 80061 LIPID PANEL: CPT

## 2019-12-10 PROCEDURE — 99214 OFFICE O/P EST MOD 30 MIN: CPT | Performed by: INTERNAL MEDICINE

## 2019-12-10 PROCEDURE — 81001 URINALYSIS AUTO W/SCOPE: CPT

## 2019-12-10 PROCEDURE — 82043 UR ALBUMIN QUANTITATIVE: CPT

## 2019-12-10 PROCEDURE — 82570 ASSAY OF URINE CREATININE: CPT

## 2019-12-10 PROCEDURE — 96372 THER/PROPH/DIAG INJ SC/IM: CPT | Performed by: INTERNAL MEDICINE

## 2019-12-10 PROCEDURE — 80053 COMPREHEN METABOLIC PANEL: CPT

## 2019-12-10 RX ORDER — CLARITHROMYCIN 500 MG/1
500 TABLET, COATED ORAL 2 TIMES DAILY
Qty: 20 TABLET | Refills: 0 | Status: SHIPPED | OUTPATIENT
Start: 2019-12-10 | End: 2020-09-22

## 2019-12-10 RX ORDER — AZELASTINE HYDROCHLORIDE 137 UG/1
2 SPRAY, METERED NASAL 2 TIMES DAILY
Qty: 1 BOTTLE | Refills: 11 | Status: SHIPPED | OUTPATIENT
Start: 2019-12-10 | End: 2021-10-05

## 2019-12-10 RX ORDER — MONTELUKAST SODIUM 10 MG/1
10 TABLET ORAL NIGHTLY
Qty: 90 TABLET | Refills: 1 | Status: SHIPPED | OUTPATIENT
Start: 2019-12-10 | End: 2021-10-05

## 2019-12-10 RX ORDER — ALBUTEROL SULFATE 90 UG/1
2 AEROSOL, METERED RESPIRATORY (INHALATION) EVERY 4 HOURS PRN
Qty: 18 G | Refills: 5 | Status: SHIPPED | OUTPATIENT
Start: 2019-12-10

## 2019-12-10 RX ORDER — TRIAMCINOLONE ACETONIDE 40 MG/ML
80 INJECTION, SUSPENSION INTRA-ARTICULAR; INTRAMUSCULAR ONCE
Status: COMPLETED | OUTPATIENT
Start: 2019-12-10 | End: 2019-12-10

## 2019-12-10 RX ADMIN — TRIAMCINOLONE ACETONIDE 80 MG: 40 INJECTION, SUSPENSION INTRA-ARTICULAR; INTRAMUSCULAR at 10:50

## 2019-12-10 NOTE — PROGRESS NOTES
[facility]     Perfecto Garcia  1970   [id number]      Patient Care Team:  Chel Blakely MD as PCP - General (Internal Medicine)    Chief Complaint   Patient presents with   • Sinusitis     thinks it never really went away            HPI  Patient is a 49 y.o. male presents with sinusitis. Onset of symptoms was abrupt starting 1 month ago.  Chronicity subacute. Severity severe.  Symptoms are associated with headache,frontal pressure,cough(dry since antibiotic),short of breath,fatigue, PND . Pertinent negatives no fever.   Symptoms are aggravated by cold air, nighttime, exercise.   Symptoms improve with ibuprofen, alkaseltzer cold and sinus , proair, fluticasone.  Context rides bike to work--cough and breathing worse since colder weather.     Saw Tea 11/15 and given Augmentin. He finished it.     Ran out of singulair,about 1 wk.    CHRONIC CONDITIONS  Allergies this fall were severe.  A lot of postnasal drainage, sneezing, runny nose, sinus congestion, cough.  He has not had allergy testing since it would have been too difficult to try to get to work and to a doctor's office for injections.  He now works at [employer] and could get the injections on campus so he would like a referral to the allergist.    Past Medical History:   Diagnosis Date   • Allergic rhinitis    • Childhood asthma     childhood   • Depression    • Erythrasma 8/29/2017   • GERD (gastroesophageal reflux disease)    • Hypercholesteremia    • Hypertension    • Low back pain    • Migraine        Past Surgical History:   Procedure Laterality Date   • CHOLECYSTECTOMY         Family History   Problem Relation Age of Onset   • Diabetes Mother    • Prostate cancer Father        Social History     Socioeconomic History   • Marital status:      Spouse name: Not on file   • Number of children: Not on file   • Years of education: Not on file   • Highest education level: Not on file   Tobacco Use   • Smoking status: Never Smoker   •  "Smokeless tobacco: Never Used   Substance and Sexual Activity   • Alcohol use: Yes     Alcohol/week: 2.0 standard drinks     Types: 2 Glasses of wine per week   • Drug use: No   • Sexual activity: Defer       Allergies   Allergen Reactions   • Effexor [Venlafaxine] Paresthesia   • Remeron [Mirtazapine] Unknown (See Comments)     Unknown reaction       Review of Systems:     Review of Systems   Constitutional: Positive for fatigue. Negative for chills and fever.   HENT: Positive for postnasal drip and sinus pressure.    Respiratory: Positive for cough, shortness of breath and wheezing.    Cardiovascular: Negative for chest pain and palpitations.   Gastrointestinal: Negative for abdominal pain, blood in stool, constipation and diarrhea.   Neurological: Positive for headache.       Vital Signs  Vitals:    12/10/19 0928   BP: 138/98   BP Location: Left arm   Patient Position: Sitting   Cuff Size: Adult   Pulse: 66   Weight: 73.9 kg (163 lb)   Height: 180.3 cm (70.98\")   PainSc: 0-No pain     Body mass index is 22.75 kg/m².      Current Outpatient Medications:   •  albuterol sulfate HFA (PROAIR HFA) 108 (90 Base) MCG/ACT inhaler, Inhale 2 puffs Every 4 (Four) Hours As Needed for Wheezing., Disp: 18 g, Rfl: 5  •  famotidine (PEPCID) 20 MG tablet, Take 1 tablet by mouth 2 (Two) Times a Day As Needed., Disp: , Rfl:   •  fluticasone (FLONASE) 50 MCG/ACT nasal spray, 1 spray into the nostril(s) as directed by provider 2 (Two) Times a Day., Disp: 1 bottle, Rfl: 5  •  Fluticasone Furoate-Vilanterol (BREO ELLIPTA) 200-25 MCG/INH inhaler, Inhale 1 puff Daily., Disp: 2 inhaler, Rfl: 0  •  guaiFENesin (MUCINEX) 600 MG 12 hr tablet, Take 2 tablets by mouth 2 (Two) Times a Day., Disp: 30 tablet, Rfl: 1  •  losartan (COZAAR) 25 MG tablet, Take 25 mg by mouth Daily., Disp: , Rfl:   •  montelukast (SINGULAIR) 10 MG tablet, Take 1 tablet by mouth Every Night., Disp: 90 tablet, Rfl: 1  •  Azelastine HCl 137 MCG/SPRAY solution, 2 sprays " into the nostril(s) as directed by provider 2 (Two) Times a Day., Disp: 1 bottle, Rfl: 11  •  clarithromycin (BIAXIN) 500 MG tablet, Take 1 tablet by mouth 2 (Two) Times a Day., Disp: 20 tablet, Rfl: 0    Physical Exam:    Physical Exam   Constitutional: He is oriented to person, place, and time. He appears well-developed and well-nourished. He appears ill.   HENT:   Head: Normocephalic.   Right Ear: Tympanic membrane and ear canal normal.   Left Ear: Tympanic membrane and ear canal normal.   Nose: Mucosal edema and congestion present. Right sinus exhibits maxillary sinus tenderness and frontal sinus tenderness. Left sinus exhibits maxillary sinus tenderness and frontal sinus tenderness.   Mouth/Throat: Uvula is midline, oropharynx is clear and moist and mucous membranes are normal.   Eyes: Pupils are equal, round, and reactive to light. Conjunctivae and EOM are normal.   Neck: Normal range of motion. Neck supple. No thyromegaly present.   Cardiovascular: Normal rate, regular rhythm, normal heart sounds and intact distal pulses.   Pulmonary/Chest: Effort normal. He has wheezes in the right upper field, the right middle field, the right lower field, the left upper field, the left middle field and the left lower field.   Musculoskeletal: Normal range of motion. He exhibits no edema.   Lymphadenopathy:     He has no cervical adenopathy.   Neurological: He is alert and oriented to person, place, and time.   Psychiatric: He has a normal mood and affect. Thought content normal.   Nursing note and vitals reviewed.       ACE III MINI        Results Review:    None    CMP:  Lab Results   Component Value Date    BUN 8 12/10/2019    CREATININE 0.91 12/10/2019    EGFRIFNONA 88 12/10/2019    EGFRIFAFRI 107 12/10/2019    BCR 8.8 12/10/2019     12/10/2019    K 4.3 12/10/2019    CO2 25.8 12/10/2019    CALCIUM 9.7 12/10/2019    ALBUMIN 4.30 12/10/2019    BILITOT 0.4 12/10/2019    ALKPHOS 93 12/10/2019    AST 16 12/10/2019     ALT 14 12/10/2019     HbA1c:  No results found for: HGBA1C  Microalbumin:  Lab Results   Component Value Date    MICROALBUR <1.2 12/10/2019     Lipid Panel  Lab Results   Component Value Date    CHOL 187 12/10/2019    TRIG 130 12/10/2019    HDL 44 12/10/2019     (H) 12/10/2019    AST 16 12/10/2019    ALT 14 12/10/2019       Medication Review: Medications reviewed and noted  Patient Instructions   Problem List Items Addressed This Visit        Respiratory    Perennial allergic rhinitis with seasonal variation    Overview     12/10/2019 Chel Blakely MD    Refer to Dr. Mackenzie Rousseau, allergist at .  Allergy testing and immunotherapy would be very helpful in this patient         Relevant Medications    triamcinolone acetonide (KENALOG-40) injection 80 mg (Completed)    fluticasone (FLONASE) 50 MCG/ACT nasal spray    Other Relevant Orders    Ambulatory Referral to Allergy (Completed)    Cough - Primary    Overview     12/10/2019 Chel Blakely MD    Chest xray today. Refer to allergist at  where he works.    Kenalog injection given today. Take biaxin twice a day for 10 days. Use fluticasone and azelastine nasal sprays twice a day.  Resume singulair tablet (montelukast) every evening.    Start breo ellipta inhaler daily. Continue using proair(albuterol) inhaler before biking or walking and as needed for shortness of breath/wheeze/cough.           Other Visit Diagnoses     Subacute frontal sinusitis        See above    Relevant Medications    clarithromycin (BIAXIN) 500 MG tablet    Moderate asthma with acute exacerbation, unspecified whether persistent        See above    Relevant Medications    triamcinolone acetonide (KENALOG-40) injection 80 mg (Completed)    montelukast (SINGULAIR) 10 MG tablet    albuterol sulfate HFA (PROAIR HFA) 108 (90 Base) MCG/ACT inhaler    Other Relevant Orders    XR Chest PA & Lateral             Diagnosis Plan   1. Cough     2. Subacute frontal sinusitis  clarithromycin  (BIAXIN) 500 MG tablet    See above   3. Moderate asthma with acute exacerbation, unspecified whether persistent  XR Chest PA & Lateral    triamcinolone acetonide (KENALOG-40) injection 80 mg    See above   4. Perennial allergic rhinitis with seasonal variation  Ambulatory Referral to Allergy       Problem List Items Addressed This Visit        Respiratory    Perennial allergic rhinitis with seasonal variation    Overview     12/10/2019 Chel Blakely MD    Refer to Dr. Mackenzie Rousseau, allergist at .  Allergy testing and immunotherapy would be very helpful in this patient         Relevant Medications    triamcinolone acetonide (KENALOG-40) injection 80 mg (Completed)    fluticasone (FLONASE) 50 MCG/ACT nasal spray    Other Relevant Orders    Ambulatory Referral to Allergy (Completed)    Cough - Primary    Overview     12/10/2019 Chel Blakely MD    Chest xray today. Refer to allergist at  where he works.    Kenalog injection given today. Take biaxin twice a day for 10 days. Use fluticasone and azelastine nasal sprays twice a day.  Resume singulair tablet (montelukast) every evening.    Start breo ellipta inhaler daily. Continue using proair(albuterol) inhaler before biking or walking and as needed for shortness of breath/wheeze/cough.           Other Visit Diagnoses     Subacute frontal sinusitis        See above    Relevant Medications    clarithromycin (BIAXIN) 500 MG tablet    Moderate asthma with acute exacerbation, unspecified whether persistent        See above    Relevant Medications    triamcinolone acetonide (KENALOG-40) injection 80 mg (Completed)    montelukast (SINGULAIR) 10 MG tablet    albuterol sulfate HFA (PROAIR HFA) 108 (90 Base) MCG/ACT inhaler    Other Relevant Orders    XR Chest PA & Lateral          Plan of care reviewed with patient at the conclusion of today's visit. Education was provided regarding diagnosis, management, and any prescribed or recommended OTC medications.Patient  verbalizes understanding of and agreement with management plan.         Chel Blakely MD

## 2019-12-11 LAB
ALBUMIN UR-MCNC: <1.2 MG/DL
CREAT UR-MCNC: 143.8 MG/DL
MICROALBUMIN/CREAT UR: NORMAL MG/G{CREAT}

## 2020-04-10 RX ORDER — LOSARTAN POTASSIUM 25 MG/1
25 TABLET ORAL DAILY
Qty: 90 TABLET | Refills: 1 | Status: SHIPPED | OUTPATIENT
Start: 2020-04-10 | End: 2020-04-20 | Stop reason: SDUPTHER

## 2020-04-20 RX ORDER — LOSARTAN POTASSIUM 25 MG/1
25 TABLET ORAL DAILY
Qty: 90 TABLET | Refills: 1 | Status: SHIPPED | OUTPATIENT
Start: 2020-04-20 | End: 2020-09-22 | Stop reason: DRUGHIGH

## 2020-06-26 ENCOUNTER — TELEPHONE (OUTPATIENT)
Dept: INTERNAL MEDICINE | Facility: CLINIC | Age: 50
End: 2020-06-26

## 2020-09-22 ENCOUNTER — TELEMEDICINE (OUTPATIENT)
Dept: INTERNAL MEDICINE | Facility: CLINIC | Age: 50
End: 2020-09-22

## 2020-09-22 DIAGNOSIS — B96.89 ACUTE BACTERIAL SINUSITIS: Primary | ICD-10-CM

## 2020-09-22 DIAGNOSIS — J30.89 PERENNIAL ALLERGIC RHINITIS WITH SEASONAL VARIATION: ICD-10-CM

## 2020-09-22 DIAGNOSIS — J01.90 ACUTE BACTERIAL SINUSITIS: Primary | ICD-10-CM

## 2020-09-22 DIAGNOSIS — J30.2 PERENNIAL ALLERGIC RHINITIS WITH SEASONAL VARIATION: ICD-10-CM

## 2020-09-22 PROCEDURE — 99213 OFFICE O/P EST LOW 20 MIN: CPT | Performed by: NURSE PRACTITIONER

## 2020-09-22 RX ORDER — DOXYCYCLINE HYCLATE 100 MG
100 TABLET ORAL 2 TIMES DAILY
Qty: 14 TABLET | Refills: 0 | Status: SHIPPED | OUTPATIENT
Start: 2020-09-22 | End: 2020-11-09

## 2020-09-22 RX ORDER — FLUTICASONE PROPIONATE 50 MCG
1 SPRAY, SUSPENSION (ML) NASAL 2 TIMES DAILY
Qty: 1 BOTTLE | Refills: 5 | Status: SHIPPED | OUTPATIENT
Start: 2020-09-22 | End: 2022-10-24

## 2020-09-22 NOTE — PATIENT INSTRUCTIONS
Sinusitis, Adult  Sinusitis is soreness and swelling (inflammation) of your sinuses. Sinuses are hollow spaces in the bones around your face. They are located:  · Around your eyes.  · In the middle of your forehead.  · Behind your nose.  · In your cheekbones.  Your sinuses and nasal passages are lined with a fluid called mucus. Mucus drains out of your sinuses. Swelling can trap mucus in your sinuses. This lets germs (bacteria, virus, or fungus) grow, which leads to infection. Most of the time, this condition is caused by a virus.  What are the causes?  This condition is caused by:  · Allergies.  · Asthma.  · Germs.  · Things that block your nose or sinuses.  · Growths in the nose (nasal polyps).  · Chemicals or irritants in the air.  · Fungus (rare).  What increases the risk?  You are more likely to develop this condition if:  · You have a weak body defense system (immune system).  · You do a lot of swimming or diving.  · You use nasal sprays too much.  · You smoke.  What are the signs or symptoms?  The main symptoms of this condition are pain and a feeling of pressure around the sinuses. Other symptoms include:  · Stuffy nose (congestion).  · Runny nose (drainage).  · Swelling and warmth in the sinuses.  · Headache.  · Toothache.  · A cough that may get worse at night.  · Mucus that collects in the throat or the back of the nose (postnasal drip).  · Being unable to smell and taste.  · Being very tired (fatigue).  · A fever.  · Sore throat.  · Bad breath.  How is this diagnosed?  This condition is diagnosed based on:  · Your symptoms.  · Your medical history.  · A physical exam.  · Tests to find out if your condition is short-term (acute) or long-term (chronic). Your doctor may:  ? Check your nose for growths (polyps).  ? Check your sinuses using a tool that has a light (endoscope).  ? Check for allergies or germs.  ? Do imaging tests, such as an MRI or CT scan.  How is this treated?  Treatment for this condition  depends on the cause and whether it is short-term or long-term.  · If caused by a virus, your symptoms should go away on their own within 10 days. You may be given medicines to relieve symptoms. They include:  ? Medicines that shrink swollen tissue in the nose.  ? Medicines that treat allergies (antihistamines).  ? A spray that treats swelling of the nostrils.   ? Rinses that help get rid of thick mucus in your nose (nasal saline washes).  · If caused by bacteria, your doctor may wait to see if you will get better without treatment. You may be given antibiotic medicine if you have:  ? A very bad infection.  ? A weak body defense system.  · If caused by growths in the nose, you may need to have surgery.  Follow these instructions at home:  Medicines  · Take, use, or apply over-the-counter and prescription medicines only as told by your doctor. These may include nasal sprays.  · If you were prescribed an antibiotic medicine, take it as told by your doctor. Do not stop taking the antibiotic even if you start to feel better.  Hydrate and humidify    · Drink enough water to keep your pee (urine) pale yellow.  · Use a cool mist humidifier to keep the humidity level in your home above 50%.  · Breathe in steam for 10-15 minutes, 3-4 times a day, or as told by your doctor. You can do this in the bathroom while a hot shower is running.  · Try not to spend time in cool or dry air.  Rest  · Rest as much as you can.  · Sleep with your head raised (elevated).  · Make sure you get enough sleep each night.  General instructions    · Put a warm, moist washcloth on your face 3-4 times a day, or as often as told by your doctor. This will help with discomfort.  · Wash your hands often with soap and water. If there is no soap and water, use hand .  · Do not smoke. Avoid being around people who are smoking (secondhand smoke).  · Keep all follow-up visits as told by your doctor. This is important.  Contact a doctor if:  · You  have a fever.  · Your symptoms get worse.  · Your symptoms do not get better within 10 days.  Get help right away if:  · You have a very bad headache.  · You cannot stop throwing up (vomiting).  · You have very bad pain or swelling around your face or eyes.  · You have trouble seeing.  · You feel confused.  · Your neck is stiff.  · You have trouble breathing.  Summary  · Sinusitis is swelling of your sinuses. Sinuses are hollow spaces in the bones around your face.  · This condition is caused by tissues in your nose that become inflamed or swollen. This traps germs. These can lead to infection.  · If you were prescribed an antibiotic medicine, take it as told by your doctor. Do not stop taking it even if you start to feel better.  · Keep all follow-up visits as told by your doctor. This is important.  This information is not intended to replace advice given to you by your health care provider. Make sure you discuss any questions you have with your health care provider.  Document Released: 06/05/2009 Document Revised: 05/20/2019 Document Reviewed: 05/20/2019  [employer] Patient Education © 2020 [employer]

## 2020-09-22 NOTE — PROGRESS NOTES
Perfecto Garcia  1970  [id number]  Patient Care Team:  Chel Blakely MD as PCP - General (Internal Medicine)    Perfecto Garcia is a pleasant 49 y.o. male who presents for evaluation of Sinusitis    Chief Complaint   Patient presents with   • Sinusitis     This video visit occurred during the Coronavirus (Covid-19) public health emergency.  Time spent was approximately 10 minutes.  Patient identity has been confirmed using name and date of birth.  I discussed with the patient the nature of our telemedicine visits that:  --I would evaluate the patient and recommend diagnostics and treatment based on my assessment.  --Our sessions are not being recorded in the personal health information is protected.  --Our team would provide follow-up care in person if/when needed.    You have chosen to receive care through a telehealth visit.  Do you consent to use a video/audio connection for your medical care today? Yes    HPI:   Sinusitis: sx of nasal drainage, thick yellow mucous x 2 wks with bilat congestion.  No fever, has sob, cough with asthma, not new. On symbicort and albuterol prn.  Using queta pot daily.  Using Singulair but no nasal sprays.   Past Medical History:   Diagnosis Date   • Allergic rhinitis    • Childhood asthma     childhood   • Depression    • Erythrasma 8/29/2017   • GERD (gastroesophageal reflux disease)    • Hypercholesteremia    • Hypertension    • Low back pain    • Migraine      Past Surgical History:   Procedure Laterality Date   • CHOLECYSTECTOMY       Family History   Problem Relation Age of Onset   • Diabetes Mother    • Prostate cancer Father      Social History     Tobacco Use   Smoking Status Never Smoker   Smokeless Tobacco Never Used     Allergies   Allergen Reactions   • Effexor [Venlafaxine] Paresthesia   • Remeron [Mirtazapine] Unknown (See Comments)     Unknown reaction       Current Outpatient Medications:   •  famotidine (PEPCID) 20 MG tablet, Take 1 tablet by mouth 2 (Two)  Times a Day As Needed., Disp: , Rfl:   •  fluticasone (FLONASE) 50 MCG/ACT nasal spray, 1 spray into the nostril(s) as directed by provider 2 (Two) Times a Day., Disp: 1 bottle, Rfl: 5  •  guaiFENesin (MUCINEX) 600 MG 12 hr tablet, Take 2 tablets by mouth 2 (Two) Times a Day., Disp: 30 tablet, Rfl: 1  •  losartan (COZAAR) 50 MG tablet, Take 1 tablet by mouth Daily., Disp: , Rfl:   •  montelukast (SINGULAIR) 10 MG tablet, Take 1 tablet by mouth Every Night., Disp: 90 tablet, Rfl: 1  •  Symbicort 160-4.5 MCG/ACT inhaler, Inhale 1 puff 2 (two) times a day., Disp: , Rfl:   •  albuterol sulfate HFA (PROAIR HFA) 108 (90 Base) MCG/ACT inhaler, Inhale 2 puffs Every 4 (Four) Hours As Needed for Wheezing., Disp: 18 g, Rfl: 5  •  Azelastine HCl 137 MCG/SPRAY solution, 2 sprays into the nostril(s) as directed by provider 2 (Two) Times a Day., Disp: 1 bottle, Rfl: 11  •  doxycycline (VIBRAMYICN) 100 MG tablet, Take 1 tablet by mouth 2 (Two) Times a Day., Disp: 14 tablet, Rfl: 0    Review of Systems  There were no vitals taken for this visit.    Physical Exam  HENT:      Head: Normocephalic.   Pulmonary:      Effort: Pulmonary effort is normal.   Neurological:      Mental Status: He is alert and oriented to person, place, and time.   Psychiatric:         Mood and Affect: Mood normal.         Behavior: Behavior normal.         Thought Content: Thought content normal.         Judgment: Judgment normal.         Results Review:  None    Assessment/Plan:  Perfecto was seen today for sinusitis.    Diagnoses and all orders for this visit:    Acute bacterial sinusitis  Comments:  doxy x 1 wk    Perennial allergic rhinitis with seasonal variation  Comments:  add Flonase and continue singulair, symbicort, queta pot  Orders:  -     fluticasone (FLONASE) 50 MCG/ACT nasal spray; 1 spray into the nostril(s) as directed by provider 2 (Two) Times a Day.    Other orders  -     doxycycline (VIBRAMYICN) 100 MG tablet; Take 1 tablet by mouth 2 (Two)  Times a Day.       Patient Instructions   Sinusitis, Adult  Sinusitis is soreness and swelling (inflammation) of your sinuses. Sinuses are hollow spaces in the bones around your face. They are located:  · Around your eyes.  · In the middle of your forehead.  · Behind your nose.  · In your cheekbones.  Your sinuses and nasal passages are lined with a fluid called mucus. Mucus drains out of your sinuses. Swelling can trap mucus in your sinuses. This lets germs (bacteria, virus, or fungus) grow, which leads to infection. Most of the time, this condition is caused by a virus.  What are the causes?  This condition is caused by:  · Allergies.  · Asthma.  · Germs.  · Things that block your nose or sinuses.  · Growths in the nose (nasal polyps).  · Chemicals or irritants in the air.  · Fungus (rare).  What increases the risk?  You are more likely to develop this condition if:  · You have a weak body defense system (immune system).  · You do a lot of swimming or diving.  · You use nasal sprays too much.  · You smoke.  What are the signs or symptoms?  The main symptoms of this condition are pain and a feeling of pressure around the sinuses. Other symptoms include:  · Stuffy nose (congestion).  · Runny nose (drainage).  · Swelling and warmth in the sinuses.  · Headache.  · Toothache.  · A cough that may get worse at night.  · Mucus that collects in the throat or the back of the nose (postnasal drip).  · Being unable to smell and taste.  · Being very tired (fatigue).  · A fever.  · Sore throat.  · Bad breath.  How is this diagnosed?  This condition is diagnosed based on:  · Your symptoms.  · Your medical history.  · A physical exam.  · Tests to find out if your condition is short-term (acute) or long-term (chronic). Your doctor may:  ? Check your nose for growths (polyps).  ? Check your sinuses using a tool that has a light (endoscope).  ? Check for allergies or germs.  ? Do imaging tests, such as an MRI or CT scan.  How is  this treated?  Treatment for this condition depends on the cause and whether it is short-term or long-term.  · If caused by a virus, your symptoms should go away on their own within 10 days. You may be given medicines to relieve symptoms. They include:  ? Medicines that shrink swollen tissue in the nose.  ? Medicines that treat allergies (antihistamines).  ? A spray that treats swelling of the nostrils.   ? Rinses that help get rid of thick mucus in your nose (nasal saline washes).  · If caused by bacteria, your doctor may wait to see if you will get better without treatment. You may be given antibiotic medicine if you have:  ? A very bad infection.  ? A weak body defense system.  · If caused by growths in the nose, you may need to have surgery.  Follow these instructions at home:  Medicines  · Take, use, or apply over-the-counter and prescription medicines only as told by your doctor. These may include nasal sprays.  · If you were prescribed an antibiotic medicine, take it as told by your doctor. Do not stop taking the antibiotic even if you start to feel better.  Hydrate and humidify    · Drink enough water to keep your pee (urine) pale yellow.  · Use a cool mist humidifier to keep the humidity level in your home above 50%.  · Breathe in steam for 10-15 minutes, 3-4 times a day, or as told by your doctor. You can do this in the bathroom while a hot shower is running.  · Try not to spend time in cool or dry air.  Rest  · Rest as much as you can.  · Sleep with your head raised (elevated).  · Make sure you get enough sleep each night.  General instructions    · Put a warm, moist washcloth on your face 3-4 times a day, or as often as told by your doctor. This will help with discomfort.  · Wash your hands often with soap and water. If there is no soap and water, use hand .  · Do not smoke. Avoid being around people who are smoking (secondhand smoke).  · Keep all follow-up visits as told by your doctor. This is  important.  Contact a doctor if:  · You have a fever.  · Your symptoms get worse.  · Your symptoms do not get better within 10 days.  Get help right away if:  · You have a very bad headache.  · You cannot stop throwing up (vomiting).  · You have very bad pain or swelling around your face or eyes.  · You have trouble seeing.  · You feel confused.  · Your neck is stiff.  · You have trouble breathing.  Summary  · Sinusitis is swelling of your sinuses. Sinuses are hollow spaces in the bones around your face.  · This condition is caused by tissues in your nose that become inflamed or swollen. This traps germs. These can lead to infection.  · If you were prescribed an antibiotic medicine, take it as told by your doctor. Do not stop taking it even if you start to feel better.  · Keep all follow-up visits as told by your doctor. This is important.  This information is not intended to replace advice given to you by your health care provider. Make sure you discuss any questions you have with your health care provider.  Document Released: 06/05/2009 Document Revised: 05/20/2019 Document Reviewed: 05/20/2019  [employer] Patient Education © 2020 [employer]      Plan of care reviewed with patient at the conclusion of today's visit. Education was provided regarding diagnosis, management and any prescribed or recommended OTC medications.  Patient verbalizes understanding of and agreement with management plan.    Return if symptoms worsen or fail to improve.    *Note that portions of this note were completed with a voice recognition program.  Efforts were made to edit the dictation but occasionally words are transcribed.    MARISELA Mcintyre

## 2020-11-09 ENCOUNTER — OFFICE VISIT (OUTPATIENT)
Dept: INTERNAL MEDICINE | Facility: CLINIC | Age: 50
End: 2020-11-09

## 2020-11-09 ENCOUNTER — TELEPHONE (OUTPATIENT)
Dept: INTERNAL MEDICINE | Facility: CLINIC | Age: 50
End: 2020-11-09

## 2020-11-09 VITALS
TEMPERATURE: 97.8 F | BODY MASS INDEX: 23.94 KG/M2 | DIASTOLIC BLOOD PRESSURE: 78 MMHG | WEIGHT: 171 LBS | HEIGHT: 71 IN | SYSTOLIC BLOOD PRESSURE: 142 MMHG | HEART RATE: 64 BPM

## 2020-11-09 DIAGNOSIS — J30.9 ALLERGIC RHINITIS, UNSPECIFIED SEASONALITY, UNSPECIFIED TRIGGER: ICD-10-CM

## 2020-11-09 DIAGNOSIS — J01.90 ACUTE BACTERIAL SINUSITIS: Primary | ICD-10-CM

## 2020-11-09 DIAGNOSIS — L30.9 DERMATITIS: ICD-10-CM

## 2020-11-09 DIAGNOSIS — B96.89 ACUTE BACTERIAL SINUSITIS: Primary | ICD-10-CM

## 2020-11-09 PROCEDURE — 96372 THER/PROPH/DIAG INJ SC/IM: CPT | Performed by: NURSE PRACTITIONER

## 2020-11-09 PROCEDURE — 99214 OFFICE O/P EST MOD 30 MIN: CPT | Performed by: NURSE PRACTITIONER

## 2020-11-09 RX ORDER — LEVOFLOXACIN 750 MG/1
750 TABLET ORAL DAILY
Qty: 5 TABLET | Refills: 0 | Status: SHIPPED | OUTPATIENT
Start: 2020-11-09 | End: 2021-08-23

## 2020-11-09 RX ORDER — TRIAMCINOLONE ACETONIDE 40 MG/ML
80 INJECTION, SUSPENSION INTRA-ARTICULAR; INTRAMUSCULAR ONCE
Status: COMPLETED | OUTPATIENT
Start: 2020-11-09 | End: 2020-11-09

## 2020-11-09 RX ADMIN — TRIAMCINOLONE ACETONIDE 80 MG: 40 INJECTION, SUSPENSION INTRA-ARTICULAR; INTRAMUSCULAR at 16:50

## 2020-11-09 NOTE — TELEPHONE ENCOUNTER
Please eval sx further prior to appt this afternoon.  Recent covid testing?  If acute sx defer to UC or get tested before coming in please

## 2020-11-09 NOTE — TELEPHONE ENCOUNTER
Patient complains of a sinus infection. He saw you 9/22 for the same issue. Patient also complains of rash under arm.     Patient is having congestion with dark yellow mucus. Patient is coughing due to the congestion. Denies SOB. He has a history of severe allergies and bronchitis. Also has asthma.     Patient had covid test last week and the results were negative.     Please advise.

## 2020-11-09 NOTE — PROGRESS NOTES
Perfecto Garcia  1970  [id number]  Patient Care Team:  Chel Blakely MD as PCP - General (Internal Medicine)    Perfecto Garcia is a pleasant 50 y.o. male who presents for evaluation of Sinus Problem    Chief Complaint   Patient presents with   • Sinus Problem       HPI:   Increased allergy sx including thick, yellow, nasal drainage, dry cough, congestion., raked leaves the twice last few weeks and sx were worse.  No facial pain, no fever.  Reports he had a negative Covid test last week when these symptoms were present.  He does use the [place] pot.  History of recurrent  bacterial sinusitis.  Doxy last mo did not help with symptoms.  Finished symbicort sample and using Breo right now, uses as needed but most of the year. Not using mucinex right now,     Asthma:  Not needed albuterol on a regular basis recently, cycling 2 mi to work daily, some days uses, brandon when cold.  Singulair and Breo daily    Rash, noticed yesterday, not itchy or painful, distributed bilateral axilla and mid left back.  Past Medical History:   Diagnosis Date   • Allergic rhinitis    • Childhood asthma     childhood   • Depression    • Erythrasma 8/29/2017   • GERD (gastroesophageal reflux disease)    • Hypercholesteremia    • Hypertension    • Low back pain    • Migraine      Past Surgical History:   Procedure Laterality Date   • CHOLECYSTECTOMY       Family History   Problem Relation Age of Onset   • Diabetes Mother    • Prostate cancer Father      Social History     Tobacco Use   Smoking Status Never Smoker   Smokeless Tobacco Never Used     Allergies   Allergen Reactions   • Effexor [Venlafaxine] Paresthesia   • Remeron [Mirtazapine] Unknown (See Comments)     Unknown reaction       Current Outpatient Medications:   •  fluticasone (FLONASE) 50 MCG/ACT nasal spray, 1 spray into the nostril(s) as directed by provider 2 (Two) Times a Day., Disp: 1 bottle, Rfl: 5  •  albuterol sulfate HFA (PROAIR HFA) 108 (90 Base) MCG/ACT inhaler, Inhale  "2 puffs Every 4 (Four) Hours As Needed for Wheezing., Disp: 18 g, Rfl: 5  •  Azelastine HCl 137 MCG/SPRAY solution, 2 sprays into the nostril(s) as directed by provider 2 (Two) Times a Day., Disp: 1 bottle, Rfl: 11  •  famotidine (PEPCID) 20 MG tablet, Take 1 tablet by mouth 2 (Two) Times a Day As Needed., Disp: , Rfl:   •  guaiFENesin (MUCINEX) 600 MG 12 hr tablet, Take 2 tablets by mouth 2 (Two) Times a Day., Disp: 30 tablet, Rfl: 1  •  levoFLOXacin (Levaquin) 750 MG tablet, Take 1 tablet by mouth Daily., Disp: 5 tablet, Rfl: 0  •  losartan (COZAAR) 50 MG tablet, Take 1 tablet by mouth Daily., Disp: , Rfl:   •  montelukast (SINGULAIR) 10 MG tablet, Take 1 tablet by mouth Every Night., Disp: 90 tablet, Rfl: 1  •  Symbicort 160-4.5 MCG/ACT inhaler, Inhale 1 puff 2 (two) times a day., Disp: , Rfl:   No current facility-administered medications for this visit.     Review of Systems   Constitutional: Negative for chills, fatigue and fever.   HENT: Positive for congestion and sinus pressure.    Respiratory: Positive for cough. Negative for shortness of breath and wheezing.    Skin: Positive for rash.   Allergic/Immunologic: Positive for environmental allergies.     /78 (BP Location: Left arm, Patient Position: Sitting, Cuff Size: Adult)   Pulse 64   Temp 97.8 °F (36.6 °C) (Temporal)   Ht 180.3 cm (70.98\")   Wt 77.6 kg (171 lb)   BMI 23.86 kg/m²     Physical Exam  Constitutional:       Appearance: He is well-developed.   HENT:      Head: Normocephalic and atraumatic.      Comments: *wearing mask  Eyes:      Conjunctiva/sclera: Conjunctivae normal.      Pupils: Pupils are equal, round, and reactive to light.   Neck:      Musculoskeletal: Normal range of motion and neck supple.   Cardiovascular:      Rate and Rhythm: Normal rate and regular rhythm.      Pulses: Normal pulses.      Heart sounds: Normal heart sounds.   Pulmonary:      Effort: Pulmonary effort is normal.      Breath sounds: Decreased air movement " present. No wheezing or rhonchi.   Musculoskeletal: Normal range of motion.   Skin:     General: Skin is warm and dry.          Neurological:      Mental Status: He is alert and oriented to person, place, and time.   Psychiatric:         Mood and Affect: Mood normal.         Behavior: Behavior normal.         Thought Content: Thought content normal.         Judgment: Judgment normal.         Procedures    Results Review:  None    PHQ-9 Total Score:      Assessment/Plan:  Diagnoses and all orders for this visit:    1. Acute bacterial sinusitis (Primary)  Comments:  levaquin x 5 days.  Increase hydration, use [place] pot    2. Allergic rhinitis, unspecified seasonality, unspecified trigger  Comments:  Kenalog today, continue Flonase, Astelin, Singulair, Breo, albuterol as needed  Orders:  -     triamcinolone acetonide (KENALOG-40) injection 80 mg    3. Dermatitis  Comments:  continue to monitor, if new sx let me know as discussed    Other orders  -     levoFLOXacin (Levaquin) 750 MG tablet; Take 1 tablet by mouth Daily.  Dispense: 5 tablet; Refill: 0       There are no Patient Instructions on file for this visit.  Plan of care reviewed with patient at the conclusion of today's visit. Education was provided regarding diagnosis, management and any prescribed or recommended OTC medications.  Patient verbalizes understanding of and agreement with management plan.    No follow-ups on file.    *Note that portions of this note were completed with a voice recognition program.  Efforts were made to edit the dictation but occasionally words are transcribed.    MARISELA Mcintyre

## 2020-12-18 RX ORDER — CLOTRIMAZOLE AND BETAMETHASONE DIPROPIONATE 10; .64 MG/G; MG/G
CREAM TOPICAL 2 TIMES DAILY PRN
Qty: 45 G | Refills: 1 | Status: SHIPPED | OUTPATIENT
Start: 2020-12-18 | End: 2021-08-23 | Stop reason: SDUPTHER

## 2021-08-23 ENCOUNTER — OFFICE VISIT (OUTPATIENT)
Dept: INTERNAL MEDICINE | Facility: CLINIC | Age: 51
End: 2021-08-23

## 2021-08-23 ENCOUNTER — LAB (OUTPATIENT)
Dept: LAB | Facility: HOSPITAL | Age: 51
End: 2021-08-23

## 2021-08-23 VITALS
SYSTOLIC BLOOD PRESSURE: 120 MMHG | HEIGHT: 71 IN | WEIGHT: 166 LBS | RESPIRATION RATE: 18 BRPM | BODY MASS INDEX: 23.24 KG/M2 | TEMPERATURE: 98.4 F | OXYGEN SATURATION: 95 % | DIASTOLIC BLOOD PRESSURE: 86 MMHG | HEART RATE: 71 BPM

## 2021-08-23 DIAGNOSIS — I10 BENIGN ESSENTIAL HYPERTENSION: ICD-10-CM

## 2021-08-23 DIAGNOSIS — E55.9 VITAMIN D DEFICIENCY: ICD-10-CM

## 2021-08-23 DIAGNOSIS — E78.00 HYPERCHOLESTEROLEMIA: ICD-10-CM

## 2021-08-23 DIAGNOSIS — K21.9 GERD WITHOUT ESOPHAGITIS: ICD-10-CM

## 2021-08-23 DIAGNOSIS — R21 RASH AND NONSPECIFIC SKIN ERUPTION: Chronic | ICD-10-CM

## 2021-08-23 DIAGNOSIS — R05.9 COUGH: ICD-10-CM

## 2021-08-23 DIAGNOSIS — J45.20 MILD INTERMITTENT ASTHMA WITHOUT COMPLICATION: ICD-10-CM

## 2021-08-23 DIAGNOSIS — J30.89 PERENNIAL ALLERGIC RHINITIS WITH SEASONAL VARIATION: ICD-10-CM

## 2021-08-23 DIAGNOSIS — J30.2 PERENNIAL ALLERGIC RHINITIS WITH SEASONAL VARIATION: ICD-10-CM

## 2021-08-23 DIAGNOSIS — Z00.00 ANNUAL PHYSICAL EXAM: Primary | ICD-10-CM

## 2021-08-23 DIAGNOSIS — Z12.11 COLON CANCER SCREENING: ICD-10-CM

## 2021-08-23 LAB
25(OH)D3 SERPL-MCNC: 27.6 NG/ML
ALBUMIN SERPL-MCNC: 4.6 G/DL (ref 3.5–5.2)
ALBUMIN UR-MCNC: <1.2 MG/DL
ALBUMIN/GLOB SERPL: 1.4 G/DL
ALP SERPL-CCNC: 114 U/L (ref 39–117)
ALT SERPL W P-5'-P-CCNC: 14 U/L (ref 1–41)
ANION GAP SERPL CALCULATED.3IONS-SCNC: 11.4 MMOL/L (ref 5–15)
AST SERPL-CCNC: 18 U/L (ref 1–40)
BACTERIA UR QL AUTO: NORMAL /HPF
BASOPHILS # BLD AUTO: 0.06 10*3/MM3 (ref 0–0.2)
BASOPHILS NFR BLD AUTO: 0.9 % (ref 0–1.5)
BILIRUB SERPL-MCNC: 0.6 MG/DL (ref 0–1.2)
BILIRUB UR QL STRIP: NEGATIVE
BUN SERPL-MCNC: 16 MG/DL (ref 6–20)
BUN/CREAT SERPL: 14 (ref 7–25)
CALCIUM SPEC-SCNC: 9.8 MG/DL (ref 8.6–10.5)
CHLORIDE SERPL-SCNC: 104 MMOL/L (ref 98–107)
CHOLEST SERPL-MCNC: 231 MG/DL (ref 0–200)
CLARITY UR: CLEAR
CO2 SERPL-SCNC: 26.6 MMOL/L (ref 22–29)
COLOR UR: YELLOW
CREAT SERPL-MCNC: 1.14 MG/DL (ref 0.76–1.27)
CREAT UR-MCNC: 180.7 MG/DL
DEPRECATED RDW RBC AUTO: 37.9 FL (ref 37–54)
EOSINOPHIL # BLD AUTO: 0.48 10*3/MM3 (ref 0–0.4)
EOSINOPHIL NFR BLD AUTO: 7.2 % (ref 0.3–6.2)
ERYTHROCYTE [DISTWIDTH] IN BLOOD BY AUTOMATED COUNT: 12.8 % (ref 12.3–15.4)
GFR SERPL CREATININE-BSD FRML MDRD: 68 ML/MIN/1.73
GFR SERPL CREATININE-BSD FRML MDRD: 82 ML/MIN/1.73
GLOBULIN UR ELPH-MCNC: 3.2 GM/DL
GLUCOSE SERPL-MCNC: 102 MG/DL (ref 65–99)
GLUCOSE UR STRIP-MCNC: NEGATIVE MG/DL
HCT VFR BLD AUTO: 48.7 % (ref 37.5–51)
HDLC SERPL-MCNC: 47 MG/DL (ref 40–60)
HGB BLD-MCNC: 16.2 G/DL (ref 13–17.7)
HGB UR QL STRIP.AUTO: NEGATIVE
HYALINE CASTS UR QL AUTO: NORMAL /LPF
IMM GRANULOCYTES # BLD AUTO: 0.02 10*3/MM3 (ref 0–0.05)
IMM GRANULOCYTES NFR BLD AUTO: 0.3 % (ref 0–0.5)
KETONES UR QL STRIP: NEGATIVE
LDLC SERPL CALC-MCNC: 157 MG/DL (ref 0–100)
LDLC/HDLC SERPL: 3.27 {RATIO}
LEUKOCYTE ESTERASE UR QL STRIP.AUTO: NEGATIVE
LYMPHOCYTES # BLD AUTO: 1.61 10*3/MM3 (ref 0.7–3.1)
LYMPHOCYTES NFR BLD AUTO: 24.1 % (ref 19.6–45.3)
MCH RBC QN AUTO: 27.6 PG (ref 26.6–33)
MCHC RBC AUTO-ENTMCNC: 33.3 G/DL (ref 31.5–35.7)
MCV RBC AUTO: 82.8 FL (ref 79–97)
MICROALBUMIN/CREAT UR: NORMAL MG/G{CREAT}
MONOCYTES # BLD AUTO: 0.52 10*3/MM3 (ref 0.1–0.9)
MONOCYTES NFR BLD AUTO: 7.8 % (ref 5–12)
NEUTROPHILS NFR BLD AUTO: 4 10*3/MM3 (ref 1.7–7)
NEUTROPHILS NFR BLD AUTO: 59.7 % (ref 42.7–76)
NITRITE UR QL STRIP: NEGATIVE
NRBC BLD AUTO-RTO: 0 /100 WBC (ref 0–0.2)
PH UR STRIP.AUTO: 6 [PH] (ref 5–8)
PLATELET # BLD AUTO: 246 10*3/MM3 (ref 140–450)
PMV BLD AUTO: 11.5 FL (ref 6–12)
POTASSIUM SERPL-SCNC: 4.4 MMOL/L (ref 3.5–5.2)
PROT SERPL-MCNC: 7.8 G/DL (ref 6–8.5)
PROT UR QL STRIP: NEGATIVE
RBC # BLD AUTO: 5.88 10*6/MM3 (ref 4.14–5.8)
RBC # UR: NORMAL /HPF
REF LAB TEST METHOD: NORMAL
SODIUM SERPL-SCNC: 142 MMOL/L (ref 136–145)
SP GR UR STRIP: 1.02 (ref 1–1.03)
SQUAMOUS #/AREA URNS HPF: NORMAL /HPF
TRIGL SERPL-MCNC: 151 MG/DL (ref 0–150)
TSH SERPL DL<=0.05 MIU/L-ACNC: 1.1 UIU/ML (ref 0.27–4.2)
UROBILINOGEN UR QL STRIP: NORMAL
VLDLC SERPL-MCNC: 27 MG/DL (ref 5–40)
WBC # BLD AUTO: 6.69 10*3/MM3 (ref 3.4–10.8)
WBC UR QL AUTO: NORMAL /HPF

## 2021-08-23 PROCEDURE — 93000 ELECTROCARDIOGRAM COMPLETE: CPT | Performed by: INTERNAL MEDICINE

## 2021-08-23 PROCEDURE — 82043 UR ALBUMIN QUANTITATIVE: CPT

## 2021-08-23 PROCEDURE — 84443 ASSAY THYROID STIM HORMONE: CPT

## 2021-08-23 PROCEDURE — 99213 OFFICE O/P EST LOW 20 MIN: CPT | Performed by: INTERNAL MEDICINE

## 2021-08-23 PROCEDURE — 85025 COMPLETE CBC W/AUTO DIFF WBC: CPT

## 2021-08-23 PROCEDURE — 81001 URINALYSIS AUTO W/SCOPE: CPT

## 2021-08-23 PROCEDURE — 80053 COMPREHEN METABOLIC PANEL: CPT

## 2021-08-23 PROCEDURE — 82306 VITAMIN D 25 HYDROXY: CPT

## 2021-08-23 PROCEDURE — 82570 ASSAY OF URINE CREATININE: CPT

## 2021-08-23 PROCEDURE — 99396 PREV VISIT EST AGE 40-64: CPT | Performed by: INTERNAL MEDICINE

## 2021-08-23 PROCEDURE — 80061 LIPID PANEL: CPT

## 2021-08-23 RX ORDER — FENOPROFEN CALCIUM 200 MG
CAPSULE ORAL 2 TIMES DAILY PRN
Qty: 118 ML | Refills: 0 | Status: SHIPPED | OUTPATIENT
Start: 2021-08-23 | End: 2021-08-23

## 2021-08-23 RX ORDER — LOSARTAN POTASSIUM 50 MG/1
50 TABLET ORAL DAILY
Qty: 90 TABLET | Refills: 1 | Status: SHIPPED | OUTPATIENT
Start: 2021-08-23 | End: 2022-06-15 | Stop reason: SDUPTHER

## 2021-08-23 RX ORDER — CLOTRIMAZOLE AND BETAMETHASONE DIPROPIONATE 10; .64 MG/G; MG/G
CREAM TOPICAL 2 TIMES DAILY PRN
Qty: 45 G | Refills: 1 | Status: SHIPPED | OUTPATIENT
Start: 2021-08-23

## 2021-08-23 NOTE — PATIENT INSTRUCTIONS
Patient Instructions   Problem List Items Addressed This Visit        Allergies and Adverse Reactions    Perennial allergic rhinitis with seasonal variation    Overview     8/23/2021 Chel Blakely MD    Sees Dr. Mackenzie Rousseau, allergist at . On immunotherapy.  Not using inhalers regularly.    Refer to ENT to evaluate continued cough and postnasal drainage and sputum production despite 2 weeks of Augmentin.         Relevant Medications    fluticasone (FLONASE) 50 MCG/ACT nasal spray    Other Relevant Orders    Ambulatory Referral to ENT (Otolaryngology)       Cardiac and Vasculature    Benign essential hypertension    Overview     8/23/2021 Chel Blakely MD    Continue losartan daily.         Relevant Medications    losartan (COZAAR) 50 MG tablet    Other Relevant Orders    CBC & Differential    Comprehensive Metabolic Panel    Microalbumin / Creatinine Urine Ratio - Urine, Clean Catch    Urinalysis With Microscopic - Urine, Clean Catch    Hypercholesterolemia    Overview     8/23/2021 Chel Blakely MD    Continue low fat diet and try to get more regular exercise.          Relevant Orders    Lipid Panel    TSH       Gastrointestinal Abdominal     GERD without esophagitis    Overview     8/23/2021 Chel Blkaely MD    Continue famotidine twice a day.         Relevant Medications    famotidine (PEPCID) 20 MG tablet       Pulmonary and Pneumonias    Mild intermittent asthma    Overview     8/23/2021 Chel Blakely MD    Continue to improve allergy control.  Continue immunotherapy.    Take inhalers more often.  Take montelukast more often.  Follow-up with Dr. Mackenzie Rousseau.         Relevant Medications    montelukast (SINGULAIR) 10 MG tablet    albuterol sulfate HFA (PROAIR HFA) 108 (90 Base) MCG/ACT inhaler    Symbicort 160-4.5 MCG/ACT inhaler    Cough    Overview     8/23/2021 Chel Blakely MD    Refer to ENT for evaluation.      Continue seeing Dr. Mackenzie Rousseau, allergist at .    Continue taking  famotidine twice a day to control acid reflux.  Resume singulair tablet (montelukast) every evening and Symbicort twice a day..    Continue using rescue inhaler as needed for shortness of breath/wheeze/cough.         Relevant Orders    Ambulatory Referral to ENT (Otolaryngology)       Skin    Rash and nonspecific skin eruption (Chronic)    Overview     8/23/2021 Chel Blakely MD    Red rash without pruritis scattered over his chest. Mainly in the central area where the hair is. No inflamed hair follicles noted on exam.    There is also a red rash that is uncomfortable and mildly pruritic between the folds of the buttocks.    The patient reports getting a rash with dry skin and splitting on the dorsal surface of the hands in the winter.    May continue Lotrisone cream on the rash on the chest and on the buttock area.  He was advised to let us know if not improving.         Relevant Medications    clotrimazole-betamethasone (LOTRISONE) 1-0.05 % cream      Other Visit Diagnoses     Annual physical exam    -  Primary    Vitamin D deficiency        Relevant Orders    Vitamin D 25 Hydroxy    Colon cancer screening        Relevant Orders    Ambulatory Referral For Screening Colonoscopy
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
## 2021-08-28 RX ORDER — MELATONIN
1000 DAILY
Qty: 60 TABLET | Refills: 5
Start: 2021-08-28 | End: 2023-01-20

## 2021-09-02 DIAGNOSIS — Z12.11 ENCOUNTER FOR SCREENING COLONOSCOPY: Primary | ICD-10-CM

## 2021-10-04 ENCOUNTER — OFFICE VISIT (OUTPATIENT)
Dept: INTERNAL MEDICINE | Facility: CLINIC | Age: 51
End: 2021-10-04

## 2021-10-04 VITALS
SYSTOLIC BLOOD PRESSURE: 130 MMHG | OXYGEN SATURATION: 99 % | TEMPERATURE: 96.8 F | BODY MASS INDEX: 23.3 KG/M2 | WEIGHT: 166.4 LBS | HEIGHT: 71 IN | DIASTOLIC BLOOD PRESSURE: 92 MMHG | HEART RATE: 82 BPM

## 2021-10-04 DIAGNOSIS — I10 BENIGN ESSENTIAL HYPERTENSION: ICD-10-CM

## 2021-10-04 DIAGNOSIS — J45.41 MODERATE PERSISTENT ASTHMA WITH ACUTE EXACERBATION: Primary | ICD-10-CM

## 2021-10-04 DIAGNOSIS — J30.89 PERENNIAL ALLERGIC RHINITIS WITH SEASONAL VARIATION: ICD-10-CM

## 2021-10-04 DIAGNOSIS — K21.9 GERD WITHOUT ESOPHAGITIS: ICD-10-CM

## 2021-10-04 DIAGNOSIS — J30.2 PERENNIAL ALLERGIC RHINITIS WITH SEASONAL VARIATION: ICD-10-CM

## 2021-10-04 PROCEDURE — 99214 OFFICE O/P EST MOD 30 MIN: CPT | Performed by: PHYSICIAN ASSISTANT

## 2021-10-04 RX ORDER — PREDNISONE 10 MG/1
TABLET ORAL
Qty: 30 TABLET | Refills: 0 | Status: SHIPPED | OUTPATIENT
Start: 2021-10-04 | End: 2021-11-01

## 2021-10-04 RX ORDER — LORATADINE 10 MG/1
CAPSULE, LIQUID FILLED ORAL
COMMUNITY
End: 2021-10-04

## 2021-10-04 RX ORDER — FLUTICASONE PROPIONATE AND SALMETEROL 232; 14 UG/1; UG/1
POWDER, METERED RESPIRATORY (INHALATION)
COMMUNITY
Start: 2021-07-21 | End: 2021-10-05

## 2021-10-04 NOTE — PROGRESS NOTES
Patient Care Team:  Chel Blakely MD as PCP - General (Internal Medicine)    Chief Complaint::   Chief Complaint   Patient presents with   • URI   • Shortness of Breath        Subjective     HPI  Jose is a 51 year old male with perennial allergic rhinitis, hypertension, GERD, and asthma, who presents with ongoing cough x several months.  He does see an allergist-Dr. Rousseau-and has allergy shots. Has been on multiple allergy medications-Singulair and azelastine nasal spray, gradually discontinued over the past month. Stopped fluticasone salmeterol 232-14mcg/ACT ~ 1 month ago, with some improvement of cough. He has been vaccinated for COVID 19 and has had multiple negative COVID tests.   Reports the cough is constant. More prevalent in the afternoon around 5pm, and when he tries to talk. The cough is not productive.  He does have history of bronchitis. Developing a dull ache in his chest, midsternal.   He did have flu like symptoms 3 weeks ago.  Wife had similar symptoms.  Both had aches and pains. He was checked for COVID, and it was negative.    Also takes famotidine 20mg tablets twice daily. Has had increase in reflux. Denies nausea, vomiting, abdominal pain.      The following portions of the patient's history were reviewed and updated as appropriate: active problem list, medication list, allergies, family history, social history    Review of Systems:   Review of Systems   Constitutional: Negative for activity change, appetite change, diaphoresis, fatigue, unexpected weight gain and unexpected weight loss.   HENT: Negative for hearing loss.    Eyes: Negative for visual disturbance.   Respiratory: Positive for choking, chest tightness and shortness of breath.    Cardiovascular: Negative for chest pain, palpitations and leg swelling.   Gastrointestinal: Positive for GERD. Negative for abdominal pain, blood in stool and indigestion.   Endocrine: Negative for cold intolerance and heat intolerance.   Genitourinary:  "Negative for dysuria and hematuria.   Musculoskeletal: Negative for arthralgias and myalgias.   Skin: Negative for skin lesions.   Allergic/Immunologic: Positive for environmental allergies.   Neurological: Negative for tremors, seizures, syncope, speech difficulty, weakness, headache, memory problem and confusion.   Hematological: Does not bruise/bleed easily.   Psychiatric/Behavioral: Negative for sleep disturbance and depressed mood. The patient is not nervous/anxious.        Vital Signs  Vitals:    10/04/21 1133   BP: 130/92   BP Location: Left arm   Patient Position: Sitting   Cuff Size: Adult   Pulse: 82   Temp: 96.8 °F (36 °C)   TempSrc: Temporal   SpO2: 99%   Weight: 75.5 kg (166 lb 6.4 oz)   Height: 180.3 cm (70.98\")   PainSc: 0-No pain     Body mass index is 23.22 kg/m².    Labs  Lab on 08/23/2021   Component Date Value Ref Range Status   • Glucose 08/23/2021 102* 65 - 99 mg/dL Final   • BUN 08/23/2021 16  6 - 20 mg/dL Final   • Creatinine 08/23/2021 1.14  0.76 - 1.27 mg/dL Final   • Sodium 08/23/2021 142  136 - 145 mmol/L Final   • Potassium 08/23/2021 4.4  3.5 - 5.2 mmol/L Final   • Chloride 08/23/2021 104  98 - 107 mmol/L Final   • CO2 08/23/2021 26.6  22.0 - 29.0 mmol/L Final   • Calcium 08/23/2021 9.8  8.6 - 10.5 mg/dL Final   • Total Protein 08/23/2021 7.8  6.0 - 8.5 g/dL Final   • Albumin 08/23/2021 4.60  3.50 - 5.20 g/dL Final   • ALT (SGPT) 08/23/2021 14  1 - 41 U/L Final   • AST (SGOT) 08/23/2021 18  1 - 40 U/L Final   • Alkaline Phosphatase 08/23/2021 114  39 - 117 U/L Final   • Total Bilirubin 08/23/2021 0.6  0.0 - 1.2 mg/dL Final   • eGFR Non  Amer 08/23/2021 68  >60 mL/min/1.73 Final   • eGFR   Amer 08/23/2021 82  >60 mL/min/1.73 Final   • Globulin 08/23/2021 3.2  gm/dL Final   • A/G Ratio 08/23/2021 1.4  g/dL Final   • BUN/Creatinine Ratio 08/23/2021 14.0  7.0 - 25.0 Final   • Anion Gap 08/23/2021 11.4  5.0 - 15.0 mmol/L Final   • Total Cholesterol 08/23/2021 231* 0 - 200 mg/dL " Final   • Triglycerides 08/23/2021 151* 0 - 150 mg/dL Final   • HDL Cholesterol 08/23/2021 47  40 - 60 mg/dL Final   • LDL Cholesterol  08/23/2021 157* 0 - 100 mg/dL Final   • VLDL Cholesterol 08/23/2021 27  5 - 40 mg/dL Final   • LDL/HDL Ratio 08/23/2021 3.27   Final   • TSH 08/23/2021 1.100  0.270 - 4.200 uIU/mL Final   • 25 Hydroxy, Vitamin D 08/23/2021 27.6  ng/ml Final   • Microalbumin/Creatinine Ratio 08/23/2021    Final    Unable to calculate   • Creatinine, Urine 08/23/2021 180.7  mg/dL Final   • Microalbumin, Urine 08/23/2021 <1.2  mg/dL Final   • WBC 08/23/2021 6.69  3.40 - 10.80 10*3/mm3 Final   • RBC 08/23/2021 5.88* 4.14 - 5.80 10*6/mm3 Final   • Hemoglobin 08/23/2021 16.2  13.0 - 17.7 g/dL Final   • Hematocrit 08/23/2021 48.7  37.5 - 51.0 % Final   • MCV 08/23/2021 82.8  79.0 - 97.0 fL Final   • MCH 08/23/2021 27.6  26.6 - 33.0 pg Final   • MCHC 08/23/2021 33.3  31.5 - 35.7 g/dL Final   • RDW 08/23/2021 12.8  12.3 - 15.4 % Final   • RDW-SD 08/23/2021 37.9  37.0 - 54.0 fl Final   • MPV 08/23/2021 11.5  6.0 - 12.0 fL Final   • Platelets 08/23/2021 246  140 - 450 10*3/mm3 Final   • Neutrophil % 08/23/2021 59.7  42.7 - 76.0 % Final   • Lymphocyte % 08/23/2021 24.1  19.6 - 45.3 % Final   • Monocyte % 08/23/2021 7.8  5.0 - 12.0 % Final   • Eosinophil % 08/23/2021 7.2* 0.3 - 6.2 % Final   • Basophil % 08/23/2021 0.9  0.0 - 1.5 % Final   • Immature Grans % 08/23/2021 0.3  0.0 - 0.5 % Final   • Neutrophils, Absolute 08/23/2021 4.00  1.70 - 7.00 10*3/mm3 Final   • Lymphocytes, Absolute 08/23/2021 1.61  0.70 - 3.10 10*3/mm3 Final   • Monocytes, Absolute 08/23/2021 0.52  0.10 - 0.90 10*3/mm3 Final   • Eosinophils, Absolute 08/23/2021 0.48* 0.00 - 0.40 10*3/mm3 Final   • Basophils, Absolute 08/23/2021 0.06  0.00 - 0.20 10*3/mm3 Final   • Immature Grans, Absolute 08/23/2021 0.02  0.00 - 0.05 10*3/mm3 Final   • nRBC 08/23/2021 0.0  0.0 - 0.2 /100 WBC Final   • Color, UA 08/23/2021 Yellow  Yellow, Straw Final   •  Appearance, UA 08/23/2021 Clear  Clear Final   • pH, UA 08/23/2021 6.0  5.0 - 8.0 Final   • Specific Gravity, UA 08/23/2021 1.024  1.005 - 1.030 Final   • Glucose, UA 08/23/2021 Negative  Negative Final   • Ketones, UA 08/23/2021 Negative  Negative Final   • Bilirubin, UA 08/23/2021 Negative  Negative Final   • Blood, UA 08/23/2021 Negative  Negative Final   • Protein, UA 08/23/2021 Negative  Negative Final   • Leuk Esterase, UA 08/23/2021 Negative  Negative Final   • Nitrite, UA 08/23/2021 Negative  Negative Final   • Urobilinogen, UA 08/23/2021 0.2 E.U./dL  0.2 - 1.0 E.U./dL Final   • RBC, UA 08/23/2021 0-2  None Seen, 0-2 /HPF Final   • WBC, UA 08/23/2021 0-2  None Seen, 0-2 /HPF Final   • Bacteria, UA 08/23/2021 None Seen  None Seen /HPF Final   • Squamous Epithelial Cells, UA 08/23/2021 0-2  None Seen, 0-2 /HPF Final   • Hyaline Casts, UA 08/23/2021 None Seen  None Seen /LPF Final   • Methodology 08/23/2021 Automated Microscopy   Final       Imaging  No radiology results for the last 30 days.      Current Outpatient Medications:   •  albuterol sulfate HFA (PROAIR HFA) 108 (90 Base) MCG/ACT inhaler, Inhale 2 puffs Every 4 (Four) Hours As Needed for Wheezing., Disp: 18 g, Rfl: 5  •  cholecalciferol (Vitamin D, Cholecalciferol,) 25 MCG (1000 UT) tablet, Take 1 tablet by mouth Daily., Disp: 60 tablet, Rfl: 5  •  clotrimazole-betamethasone (LOTRISONE) 1-0.05 % cream, Apply  topically to the appropriate area as directed 2 (Two) Times a Day As Needed (rash)., Disp: 45 g, Rfl: 1  •  famotidine (PEPCID) 20 MG tablet, Take 1 tablet by mouth 2 (Two) Times a Day As Needed., Disp: , Rfl:   •  fluticasone (FLONASE) 50 MCG/ACT nasal spray, 1 spray into the nostril(s) as directed by provider 2 (Two) Times a Day., Disp: 1 bottle, Rfl: 5  •  losartan (COZAAR) 50 MG tablet, Take 1 tablet by mouth Daily., Disp: 90 tablet, Rfl: 1  •  Sod Picosulfate-Mag Ox-Cit Acd 10-3.5-12 MG-GM -GM/160ML solution, Take 1 kit by mouth Take As  Directed., Disp: 320 mL, Rfl: 0  •  predniSONE (DELTASONE) 10 MG tablet, Take 4 tabs x 3 days, then 3 tabs x 3 days, then 2 tabs x 3 days, then 1 tab x 3 days, Disp: 30 tablet, Rfl: 0    Physical Exam:    Physical Exam  Vitals reviewed.   Constitutional:       Appearance: Normal appearance. He is well-developed.   HENT:      Head: Normocephalic and atraumatic.      Right Ear: Hearing, tympanic membrane, ear canal and external ear normal.      Left Ear: Hearing, tympanic membrane, ear canal and external ear normal.      Nose: Nose normal.      Mouth/Throat:      Pharynx: Uvula midline.   Eyes:      General: Lids are normal.      Conjunctiva/sclera: Conjunctivae normal.      Pupils: Pupils are equal, round, and reactive to light.   Cardiovascular:      Rate and Rhythm: Normal rate and regular rhythm.      Heart sounds: Normal heart sounds.   Pulmonary:      Effort: Pulmonary effort is normal.      Breath sounds: Decreased air movement present. Decreased breath sounds present. No wheezing or rales.   Abdominal:      General: Bowel sounds are normal.      Palpations: Abdomen is soft.   Musculoskeletal:         General: Normal range of motion.      Cervical back: Full passive range of motion without pain, normal range of motion and neck supple.   Skin:     General: Skin is warm and dry.   Neurological:      Mental Status: He is alert and oriented to person, place, and time.      Deep Tendon Reflexes: Reflexes are normal and symmetric.   Psychiatric:         Speech: Speech normal.         Behavior: Behavior normal.         Thought Content: Thought content normal.         Judgment: Judgment normal.         Procedures        Assessment/Plan   Problem List Items Addressed This Visit        Allergies and Adverse Reactions    Perennial allergic rhinitis with seasonal variation    Overview     10/5/2021 Katja Dalton PA-C    Sees Dr. Mackenzie Rousseau, allergist at . On immunotherapy.  Not using inhalers regularly.            Relevant Medications    fluticasone (FLONASE) 50 MCG/ACT nasal spray    predniSONE (DELTASONE) 10 MG tablet       Cardiac and Vasculature    Benign essential hypertension    Overview     10/5/2021 Katja Dalton PA-C    Continue losartan daily.         Relevant Medications    losartan (COZAAR) 50 MG tablet       Gastrointestinal Abdominal     GERD without esophagitis    Overview     10/5/2021 Katja Dalton PA-C    Continue famotidine twice a day. Discussed that cough may be due to increase in reflux.  He wants to hold on any additional medication at this time.  Will continue to monitor.         Relevant Medications    famotidine (PEPCID) 20 MG tablet       Pulmonary and Pneumonias    Moderate persistent asthma with acute exacerbation - Primary    Overview     Reluctant to start a new inhaler. Will try prednisone 10mg taper.            Relevant Medications    albuterol sulfate HFA (PROAIR HFA) 108 (90 Base) MCG/ACT inhaler    predniSONE (DELTASONE) 10 MG tablet          Return for NEXT SCHEDULED FOLLOW UP.    Plan of care reviewed with patient at the conclusion of today's visit. Education was provided regarding diagnosis, management, and any prescribed or recommended OTC medications.Patient verbalizes understanding of and agreement with management plan.     I spent 35 minutes caring for Perfecto on this date of service. This time includes time spent by me in the following activities:preparing for the visit, reviewing tests, obtaining and/or reviewing a separately obtained history, performing a medically appropriate examination and/or evaluation , counseling and educating the patient/family/caregiver, ordering medications, tests, or procedures, referring and communicating with other health care professionals  and documenting information in the medical record    Katja Dalton PA-C    Please note that portions of this note were completed with a voice recognition program. Efforts were made to edit  the dictations, but occasionally words are mistranscribed.

## 2021-10-05 PROBLEM — J45.41 MODERATE PERSISTENT ASTHMA WITH ACUTE EXACERBATION: Status: ACTIVE | Noted: 2021-10-05

## 2021-11-01 ENCOUNTER — HOSPITAL ENCOUNTER (OUTPATIENT)
Dept: GENERAL RADIOLOGY | Facility: HOSPITAL | Age: 51
Discharge: HOME | End: 2021-11-01
Admitting: PHYSICIAN ASSISTANT

## 2021-11-01 ENCOUNTER — OFFICE VISIT (OUTPATIENT)
Dept: INTERNAL MEDICINE | Facility: CLINIC | Age: 51
End: 2021-11-01

## 2021-11-01 VITALS
OXYGEN SATURATION: 97 % | SYSTOLIC BLOOD PRESSURE: 122 MMHG | WEIGHT: 165 LBS | HEART RATE: 82 BPM | DIASTOLIC BLOOD PRESSURE: 87 MMHG | TEMPERATURE: 97.8 F | HEIGHT: 71 IN | BODY MASS INDEX: 23.1 KG/M2

## 2021-11-01 DIAGNOSIS — J45.41 MODERATE PERSISTENT ASTHMA WITH ACUTE EXACERBATION: ICD-10-CM

## 2021-11-01 DIAGNOSIS — I10 BENIGN ESSENTIAL HYPERTENSION: Primary | ICD-10-CM

## 2021-11-01 DIAGNOSIS — J30.2 PERENNIAL ALLERGIC RHINITIS WITH SEASONAL VARIATION: ICD-10-CM

## 2021-11-01 DIAGNOSIS — J30.89 PERENNIAL ALLERGIC RHINITIS WITH SEASONAL VARIATION: ICD-10-CM

## 2021-11-01 DIAGNOSIS — K21.9 GERD WITHOUT ESOPHAGITIS: ICD-10-CM

## 2021-11-01 PROCEDURE — 71046 X-RAY EXAM CHEST 2 VIEWS: CPT

## 2021-11-01 PROCEDURE — 99214 OFFICE O/P EST MOD 30 MIN: CPT | Performed by: PHYSICIAN ASSISTANT

## 2021-11-01 RX ORDER — AZITHROMYCIN 250 MG/1
TABLET, FILM COATED ORAL
Qty: 6 TABLET | Refills: 0 | Status: SHIPPED | OUTPATIENT
Start: 2021-11-01 | End: 2021-11-16

## 2021-11-01 NOTE — PROGRESS NOTES
Patient Care Team:  Chel Blakely MD as PCP - General (Internal Medicine)    Chief Complaint::   Chief Complaint   Patient presents with   • Cough     fatigue    • Chills     body aches         Subjective     HPI  Jose is a 51 year old male with perennial allergic rhinitis, hypertension, GERD, and asthma, who presents with ongoing cough x several months.  He does see an allergist-Dr. Rousseau-and has allergy shots. Has been on multiple allergy medications-Singulair and azelastine nasal spray, gradually discontinued over the past month. Stopped fluticasone salmeterol 232-14mcg/ACT ~ 1 month ago, with some improvement of cough. He has been vaccinated for COVID 19, received COVID-19 booster last month, and has had multiple negative COVID tests.   Reports the cough is constant. More prevalent in the afternoon around 5pm, and when he tries to talk. The cough is not productive.  He does have history of bronchitis. Developing a dull ache in his chest, midsternal.  He was also seen in the office on October 4, 2021 with similar symptoms.  At the time he did take a prednisone taper, which did improve symptoms.  However, as soon as he finished the prednisone symptoms return.  He is now taking albuterol up to 3 times daily.  He is not on a daily inhaler.  On October 16, he developed fatigue and chills.  Was taking Romy[place] cold which did help with symptoms.  His wife is also ill on Wednesday.  Having difficulty climbing up stairs, shortness of breath.  Wakes up at night coughing.  Did have PFT testing with allergist in August 2021.  Uses famotidine daily.  History of GERD.      The following portions of the patient's history were reviewed and updated as appropriate: active problem list, medication list, allergies, family history, social history    Review of Systems:   Review of Systems   Constitutional: Negative for activity change, appetite change, diaphoresis, fatigue, unexpected weight gain and unexpected weight loss.  "  HENT: Positive for rhinorrhea. Negative for congestion, hearing loss, postnasal drip, sinus pressure and trouble swallowing.    Eyes: Negative for blurred vision, double vision and visual disturbance.   Respiratory: Positive for cough, chest tightness, shortness of breath and wheezing.    Cardiovascular: Negative for chest pain, palpitations and leg swelling.   Gastrointestinal: Negative for abdominal pain, blood in stool, GERD and indigestion.   Endocrine: Negative for cold intolerance and heat intolerance.   Genitourinary: Negative for dysuria and hematuria.   Musculoskeletal: Negative for arthralgias and myalgias.   Skin: Negative for skin lesions.   Allergic/Immunologic: Positive for environmental allergies.   Neurological: Negative for tremors, seizures, syncope, speech difficulty, weakness, headache, memory problem and confusion.   Hematological: Does not bruise/bleed easily.   Psychiatric/Behavioral: Negative for sleep disturbance and depressed mood. The patient is not nervous/anxious.        Vital Signs  Vitals:    11/01/21 1126   BP: 122/87   BP Location: Left arm   Patient Position: Sitting   Cuff Size: Adult   Pulse: 82   Temp: 97.8 °F (36.6 °C)   TempSrc: Temporal   SpO2: 97%   Weight: 74.8 kg (165 lb)   Height: 180.3 cm (70.98\")   PainSc:   3     Body mass index is 23.02 kg/m².    Labs  Lab on 08/23/2021   Component Date Value Ref Range Status   • Glucose 08/23/2021 102* 65 - 99 mg/dL Final   • BUN 08/23/2021 16  6 - 20 mg/dL Final   • Creatinine 08/23/2021 1.14  0.76 - 1.27 mg/dL Final   • Sodium 08/23/2021 142  136 - 145 mmol/L Final   • Potassium 08/23/2021 4.4  3.5 - 5.2 mmol/L Final   • Chloride 08/23/2021 104  98 - 107 mmol/L Final   • CO2 08/23/2021 26.6  22.0 - 29.0 mmol/L Final   • Calcium 08/23/2021 9.8  8.6 - 10.5 mg/dL Final   • Total Protein 08/23/2021 7.8  6.0 - 8.5 g/dL Final   • Albumin 08/23/2021 4.60  3.50 - 5.20 g/dL Final   • ALT (SGPT) 08/23/2021 14  1 - 41 U/L Final   • AST " (SGOT) 08/23/2021 18  1 - 40 U/L Final   • Alkaline Phosphatase 08/23/2021 114  39 - 117 U/L Final   • Total Bilirubin 08/23/2021 0.6  0.0 - 1.2 mg/dL Final   • eGFR Non  Amer 08/23/2021 68  >60 mL/min/1.73 Final   • eGFR   Amer 08/23/2021 82  >60 mL/min/1.73 Final   • Globulin 08/23/2021 3.2  gm/dL Final   • A/G Ratio 08/23/2021 1.4  g/dL Final   • BUN/Creatinine Ratio 08/23/2021 14.0  7.0 - 25.0 Final   • Anion Gap 08/23/2021 11.4  5.0 - 15.0 mmol/L Final   • Total Cholesterol 08/23/2021 231* 0 - 200 mg/dL Final   • Triglycerides 08/23/2021 151* 0 - 150 mg/dL Final   • HDL Cholesterol 08/23/2021 47  40 - 60 mg/dL Final   • LDL Cholesterol  08/23/2021 157* 0 - 100 mg/dL Final   • VLDL Cholesterol 08/23/2021 27  5 - 40 mg/dL Final   • LDL/HDL Ratio 08/23/2021 3.27   Final   • TSH 08/23/2021 1.100  0.270 - 4.200 uIU/mL Final   • 25 Hydroxy, Vitamin D 08/23/2021 27.6  ng/ml Final   • Microalbumin/Creatinine Ratio 08/23/2021    Final    Unable to calculate   • Creatinine, Urine 08/23/2021 180.7  mg/dL Final   • Microalbumin, Urine 08/23/2021 <1.2  mg/dL Final   • WBC 08/23/2021 6.69  3.40 - 10.80 10*3/mm3 Final   • RBC 08/23/2021 5.88* 4.14 - 5.80 10*6/mm3 Final   • Hemoglobin 08/23/2021 16.2  13.0 - 17.7 g/dL Final   • Hematocrit 08/23/2021 48.7  37.5 - 51.0 % Final   • MCV 08/23/2021 82.8  79.0 - 97.0 fL Final   • MCH 08/23/2021 27.6  26.6 - 33.0 pg Final   • MCHC 08/23/2021 33.3  31.5 - 35.7 g/dL Final   • RDW 08/23/2021 12.8  12.3 - 15.4 % Final   • RDW-SD 08/23/2021 37.9  37.0 - 54.0 fl Final   • MPV 08/23/2021 11.5  6.0 - 12.0 fL Final   • Platelets 08/23/2021 246  140 - 450 10*3/mm3 Final   • Neutrophil % 08/23/2021 59.7  42.7 - 76.0 % Final   • Lymphocyte % 08/23/2021 24.1  19.6 - 45.3 % Final   • Monocyte % 08/23/2021 7.8  5.0 - 12.0 % Final   • Eosinophil % 08/23/2021 7.2* 0.3 - 6.2 % Final   • Basophil % 08/23/2021 0.9  0.0 - 1.5 % Final   • Immature Grans % 08/23/2021 0.3  0.0 - 0.5 % Final    • Neutrophils, Absolute 08/23/2021 4.00  1.70 - 7.00 10*3/mm3 Final   • Lymphocytes, Absolute 08/23/2021 1.61  0.70 - 3.10 10*3/mm3 Final   • Monocytes, Absolute 08/23/2021 0.52  0.10 - 0.90 10*3/mm3 Final   • Eosinophils, Absolute 08/23/2021 0.48* 0.00 - 0.40 10*3/mm3 Final   • Basophils, Absolute 08/23/2021 0.06  0.00 - 0.20 10*3/mm3 Final   • Immature Grans, Absolute 08/23/2021 0.02  0.00 - 0.05 10*3/mm3 Final   • nRBC 08/23/2021 0.0  0.0 - 0.2 /100 WBC Final   • Color, UA 08/23/2021 Yellow  Yellow, Straw Final   • Appearance, UA 08/23/2021 Clear  Clear Final   • pH, UA 08/23/2021 6.0  5.0 - 8.0 Final   • Specific Gravity, UA 08/23/2021 1.024  1.005 - 1.030 Final   • Glucose, UA 08/23/2021 Negative  Negative Final   • Ketones, UA 08/23/2021 Negative  Negative Final   • Bilirubin, UA 08/23/2021 Negative  Negative Final   • Blood, UA 08/23/2021 Negative  Negative Final   • Protein, UA 08/23/2021 Negative  Negative Final   • Leuk Esterase, UA 08/23/2021 Negative  Negative Final   • Nitrite, UA 08/23/2021 Negative  Negative Final   • Urobilinogen, UA 08/23/2021 0.2 E.U./dL  0.2 - 1.0 E.U./dL Final   • RBC, UA 08/23/2021 0-2  None Seen, 0-2 /HPF Final   • WBC, UA 08/23/2021 0-2  None Seen, 0-2 /HPF Final   • Bacteria, UA 08/23/2021 None Seen  None Seen /HPF Final   • Squamous Epithelial Cells, UA 08/23/2021 0-2  None Seen, 0-2 /HPF Final   • Hyaline Casts, UA 08/23/2021 None Seen  None Seen /LPF Final   • Methodology 08/23/2021 Automated Microscopy   Final       Imaging  XR Chest PA & Lateral    Result Date: 11/1/2021  Opacifications in the right upper lobe inferior portion right midlung likely airspace disease such as bronchopneumonia without pleural effusion.  D:  11/01/2021 E:  11/01/2021  This report was finalized on 11/1/2021 3:42 PM by Dr. August Cabrera.          Current Outpatient Medications:   •  albuterol sulfate HFA (PROAIR HFA) 108 (90 Base) MCG/ACT inhaler, Inhale 2 puffs Every 4 (Four) Hours As Needed  for Wheezing., Disp: 18 g, Rfl: 5  •  cholecalciferol (Vitamin D, Cholecalciferol,) 25 MCG (1000 UT) tablet, Take 1 tablet by mouth Daily., Disp: 60 tablet, Rfl: 5  •  clotrimazole-betamethasone (LOTRISONE) 1-0.05 % cream, Apply  topically to the appropriate area as directed 2 (Two) Times a Day As Needed (rash)., Disp: 45 g, Rfl: 1  •  famotidine (PEPCID) 20 MG tablet, Take 1 tablet by mouth 2 (Two) Times a Day As Needed., Disp: , Rfl:   •  fluticasone (FLONASE) 50 MCG/ACT nasal spray, 1 spray into the nostril(s) as directed by provider 2 (Two) Times a Day., Disp: 1 bottle, Rfl: 5  •  losartan (COZAAR) 50 MG tablet, Take 1 tablet by mouth Daily., Disp: 90 tablet, Rfl: 1  •  azithromycin (Zithromax Z-Enrico) 250 MG tablet, Take 2 tablets the first day, then 1 tablet daily for 4 days., Disp: 6 tablet, Rfl: 0  •  Fluticasone Furoate-Vilanterol (Breo Ellipta) 100-25 MCG/INH inhaler, Inhale 1 puff Daily., Disp: 1 each, Rfl: 3    Physical Exam:    Physical Exam  Vitals reviewed.   Constitutional:       Appearance: Normal appearance. He is well-developed.   HENT:      Head: Normocephalic and atraumatic.      Right Ear: Hearing, tympanic membrane, ear canal and external ear normal.      Left Ear: Hearing, tympanic membrane, ear canal and external ear normal.      Nose: Nose normal.      Mouth/Throat:      Pharynx: Uvula midline.   Eyes:      General: Lids are normal.      Conjunctiva/sclera: Conjunctivae normal.      Pupils: Pupils are equal, round, and reactive to light.   Cardiovascular:      Rate and Rhythm: Normal rate and regular rhythm.      Pulses: Normal pulses.      Heart sounds: Normal heart sounds.   Pulmonary:      Effort: Pulmonary effort is normal.      Breath sounds: Normal breath sounds. No wheezing.   Abdominal:      General: Bowel sounds are normal.      Palpations: Abdomen is soft.   Musculoskeletal:         General: Normal range of motion.      Cervical back: Full passive range of motion without pain,  normal range of motion and neck supple.   Skin:     General: Skin is warm and dry.   Neurological:      Mental Status: He is alert and oriented to person, place, and time.      Deep Tendon Reflexes: Reflexes are normal and symmetric.   Psychiatric:         Speech: Speech normal.         Behavior: Behavior normal.         Thought Content: Thought content normal.         Judgment: Judgment normal.         Procedures        Assessment/Plan   Problem List Items Addressed This Visit        Allergies and Adverse Reactions    Perennial allergic rhinitis with seasonal variation    Overview     10/5/2021 Katja Dalton PA-C    Sees Dr. Mackenzie Rousseau, allergist at . On immunotherapy.  Not using inhalers regularly.           Relevant Medications    fluticasone (FLONASE) 50 MCG/ACT nasal spray       Cardiac and Vasculature    Benign essential hypertension - Primary    Overview     10/5/2021 Katja Dalton PA-C    Continue losartan daily.         Relevant Medications    losartan (COZAAR) 50 MG tablet       Gastrointestinal Abdominal     GERD without esophagitis    Overview     10/5/2021 Katja Dalton PA-C    May increase famotidine to twice a day.          Relevant Medications    famotidine (PEPCID) 20 MG tablet       Pulmonary and Pneumonias    Moderate persistent asthma with acute exacerbation    Overview     Some improvement with prednisone taper.  Discussed daily inhaler.  Was on Advair for quite some time.  Trial of Breo.  Z-Enrico sent to pharmacy.           Relevant Medications    albuterol sulfate HFA (PROAIR HFA) 108 (90 Base) MCG/ACT inhaler    Fluticasone Furoate-Vilanterol (Breo Ellipta) 100-25 MCG/INH inhaler    azithromycin (Zithromax Z-Enrico) 250 MG tablet    Other Relevant Orders    XR Chest PA & Lateral (Completed)          Return if symptoms worsen or fail to improve.    Plan of care reviewed with patient at the conclusion of today's visit. Education was provided regarding diagnosis,  management, and any prescribed or recommended OTC medications.Patient verbalizes understanding of and agreement with management plan.     I spent 30 minutes caring for Perfecto on this date of service. This time includes time spent by me in the following activities:preparing for the visit, reviewing tests, obtaining and/or reviewing a separately obtained history, performing a medically appropriate examination and/or evaluation , counseling and educating the patient/family/caregiver, ordering medications, tests, or procedures and documenting information in the medical record    Katja Dalton PA-C    Please note that portions of this note were completed with a voice recognition program. Efforts were made to edit the dictations, but occasionally words are mistranscribed.

## 2021-11-02 ENCOUNTER — CLINICAL SUPPORT (OUTPATIENT)
Dept: INTERNAL MEDICINE | Facility: CLINIC | Age: 51
End: 2021-11-02

## 2021-11-11 ENCOUNTER — TELEPHONE (OUTPATIENT)
Dept: INTERNAL MEDICINE | Facility: CLINIC | Age: 51
End: 2021-11-11

## 2021-11-11 NOTE — TELEPHONE ENCOUNTER
I called patient.  States the pneumonia is better.  Still coughing but not productive.  He thinks his inhaler is causing him to cough.  He did not fill the RX for the Breo because it was $180.  Afebrile.   Appt scheduled for follow up on 11/16/21.

## 2021-11-11 NOTE — TELEPHONE ENCOUNTER
Please call patient and see if symptoms have improved with medications.  He was diagnosed with pneumonia at last appointment.  I would like for him to follow-up next week in the office, if possible.

## 2021-11-16 ENCOUNTER — OFFICE VISIT (OUTPATIENT)
Dept: INTERNAL MEDICINE | Facility: CLINIC | Age: 51
End: 2021-11-16

## 2021-11-16 ENCOUNTER — LAB (OUTPATIENT)
Dept: LAB | Facility: HOSPITAL | Age: 51
End: 2021-11-16

## 2021-11-16 ENCOUNTER — HOSPITAL ENCOUNTER (OUTPATIENT)
Dept: GENERAL RADIOLOGY | Facility: HOSPITAL | Age: 51
Discharge: HOME | End: 2021-11-16

## 2021-11-16 VITALS
BODY MASS INDEX: 23.21 KG/M2 | WEIGHT: 165.8 LBS | HEART RATE: 71 BPM | OXYGEN SATURATION: 97 % | TEMPERATURE: 99.1 F | SYSTOLIC BLOOD PRESSURE: 130 MMHG | HEIGHT: 71 IN | DIASTOLIC BLOOD PRESSURE: 99 MMHG

## 2021-11-16 DIAGNOSIS — J18.9 PNEUMONIA OF RIGHT MIDDLE LOBE DUE TO INFECTIOUS ORGANISM: Primary | ICD-10-CM

## 2021-11-16 DIAGNOSIS — J30.2 PERENNIAL ALLERGIC RHINITIS WITH SEASONAL VARIATION: ICD-10-CM

## 2021-11-16 DIAGNOSIS — J18.9 PNEUMONIA OF RIGHT MIDDLE LOBE DUE TO INFECTIOUS ORGANISM: ICD-10-CM

## 2021-11-16 DIAGNOSIS — J45.41 MODERATE PERSISTENT ASTHMA WITH ACUTE EXACERBATION: ICD-10-CM

## 2021-11-16 DIAGNOSIS — J30.89 PERENNIAL ALLERGIC RHINITIS WITH SEASONAL VARIATION: ICD-10-CM

## 2021-11-16 DIAGNOSIS — J18.9 PNEUMONIA OF RIGHT UPPER LOBE DUE TO INFECTIOUS ORGANISM: ICD-10-CM

## 2021-11-16 DIAGNOSIS — R68.83 CHILLS: ICD-10-CM

## 2021-11-16 DIAGNOSIS — I10 BENIGN ESSENTIAL HYPERTENSION: ICD-10-CM

## 2021-11-16 DIAGNOSIS — K21.9 GERD WITHOUT ESOPHAGITIS: ICD-10-CM

## 2021-11-16 LAB
ALBUMIN SERPL-MCNC: 4.2 G/DL (ref 3.5–5.2)
ALBUMIN/GLOB SERPL: 1.2 G/DL
ALP SERPL-CCNC: 105 U/L (ref 39–117)
ALT SERPL W P-5'-P-CCNC: 14 U/L (ref 1–41)
ANION GAP SERPL CALCULATED.3IONS-SCNC: 8.4 MMOL/L (ref 5–15)
AST SERPL-CCNC: 22 U/L (ref 1–40)
BASOPHILS # BLD AUTO: 0.07 10*3/MM3 (ref 0–0.2)
BASOPHILS NFR BLD AUTO: 0.9 % (ref 0–1.5)
BILIRUB SERPL-MCNC: 0.2 MG/DL (ref 0–1.2)
BUN SERPL-MCNC: 13 MG/DL (ref 6–20)
BUN/CREAT SERPL: 13.5 (ref 7–25)
CALCIUM SPEC-SCNC: 9.7 MG/DL (ref 8.6–10.5)
CHLORIDE SERPL-SCNC: 105 MMOL/L (ref 98–107)
CO2 SERPL-SCNC: 26.6 MMOL/L (ref 22–29)
CREAT SERPL-MCNC: 0.96 MG/DL (ref 0.76–1.27)
DEPRECATED RDW RBC AUTO: 37.2 FL (ref 37–54)
EOSINOPHIL # BLD AUTO: 0.22 10*3/MM3 (ref 0–0.4)
EOSINOPHIL NFR BLD AUTO: 2.8 % (ref 0.3–6.2)
ERYTHROCYTE [DISTWIDTH] IN BLOOD BY AUTOMATED COUNT: 12.5 % (ref 12.3–15.4)
GFR SERPL CREATININE-BSD FRML MDRD: 100 ML/MIN/1.73
GFR SERPL CREATININE-BSD FRML MDRD: 82 ML/MIN/1.73
GLOBULIN UR ELPH-MCNC: 3.5 GM/DL
GLUCOSE SERPL-MCNC: 88 MG/DL (ref 65–99)
HCT VFR BLD AUTO: 45 % (ref 37.5–51)
HGB BLD-MCNC: 14.3 G/DL (ref 13–17.7)
IMM GRANULOCYTES # BLD AUTO: 0.02 10*3/MM3 (ref 0–0.05)
IMM GRANULOCYTES NFR BLD AUTO: 0.3 % (ref 0–0.5)
LYMPHOCYTES # BLD AUTO: 1.51 10*3/MM3 (ref 0.7–3.1)
LYMPHOCYTES NFR BLD AUTO: 19.5 % (ref 19.6–45.3)
MCH RBC QN AUTO: 26.4 PG (ref 26.6–33)
MCHC RBC AUTO-ENTMCNC: 31.8 G/DL (ref 31.5–35.7)
MCV RBC AUTO: 83.2 FL (ref 79–97)
MONOCYTES # BLD AUTO: 0.48 10*3/MM3 (ref 0.1–0.9)
MONOCYTES NFR BLD AUTO: 6.2 % (ref 5–12)
NEUTROPHILS NFR BLD AUTO: 5.43 10*3/MM3 (ref 1.7–7)
NEUTROPHILS NFR BLD AUTO: 70.3 % (ref 42.7–76)
NRBC BLD AUTO-RTO: 0 /100 WBC (ref 0–0.2)
PLATELET # BLD AUTO: 313 10*3/MM3 (ref 140–450)
PMV BLD AUTO: 11.8 FL (ref 6–12)
POTASSIUM SERPL-SCNC: 4.4 MMOL/L (ref 3.5–5.2)
PROT SERPL-MCNC: 7.7 G/DL (ref 6–8.5)
RBC # BLD AUTO: 5.41 10*6/MM3 (ref 4.14–5.8)
SODIUM SERPL-SCNC: 140 MMOL/L (ref 136–145)
T3FREE SERPL-MCNC: 3.59 PG/ML (ref 2–4.4)
T4 FREE SERPL-MCNC: 1.38 NG/DL (ref 0.93–1.7)
TSH SERPL DL<=0.05 MIU/L-ACNC: 1.29 UIU/ML (ref 0.27–4.2)
WBC # BLD AUTO: 7.73 10*3/MM3 (ref 3.4–10.8)

## 2021-11-16 PROCEDURE — 84439 ASSAY OF FREE THYROXINE: CPT

## 2021-11-16 PROCEDURE — 99214 OFFICE O/P EST MOD 30 MIN: CPT | Performed by: PHYSICIAN ASSISTANT

## 2021-11-16 PROCEDURE — 84481 FREE ASSAY (FT-3): CPT

## 2021-11-16 PROCEDURE — 85025 COMPLETE CBC W/AUTO DIFF WBC: CPT

## 2021-11-16 PROCEDURE — 71046 X-RAY EXAM CHEST 2 VIEWS: CPT

## 2021-11-16 PROCEDURE — 84443 ASSAY THYROID STIM HORMONE: CPT

## 2021-11-16 PROCEDURE — 80053 COMPREHEN METABOLIC PANEL: CPT

## 2021-11-16 RX ORDER — MONTELUKAST SODIUM 10 MG/1
10 TABLET ORAL NIGHTLY
Qty: 30 TABLET | Refills: 1 | Status: SHIPPED | OUTPATIENT
Start: 2021-11-16

## 2021-11-16 NOTE — PROGRESS NOTES
Patient Care Team:  Chel Blakely MD as PCP - General (Internal Medicine)    Chief Complaint::   Chief Complaint   Patient presents with   • Asthma     follow up         Subjective     HPI  Jose is a 51-year-old male with history of asthma, GERD, and hypertension, who presents for follow-up of pneumonia.  Found to have right middle lobe pneumonia on 11/1/2021.  Initially, Zithromax was prescribed.  He did return to the office for Rocephin injection.  He was advised to continue Advair 550 twice daily and use albuterol inhaler as needed.  At that time, he did have deep cough with chest pain.  Today, he reports some improvement.  He does continue to have a dry hacking cough.  Feels Advair may be attributed to this.  Continues to have chills and sweats at night.  Unsure if it is the change in weather and bedding or something else.  Weight has remained the same.  He has had both COVID vaccinations and booster shot.      The following portions of the patient's history were reviewed and updated as appropriate: active problem list, medication list, allergies, family history, social history    Review of Systems:   Review of Systems   Constitutional: Positive for chills. Negative for activity change, appetite change, diaphoresis, fatigue, fever, unexpected weight gain and unexpected weight loss.   HENT: Negative for congestion, hearing loss, postnasal drip, rhinorrhea, sinus pressure, sneezing, sore throat, swollen glands and trouble swallowing.    Eyes: Negative for visual disturbance.   Respiratory: Positive for cough. Negative for chest tightness and shortness of breath.    Cardiovascular: Negative for chest pain, palpitations and leg swelling.   Gastrointestinal: Negative for abdominal pain, blood in stool, GERD and indigestion.   Endocrine: Negative for cold intolerance and heat intolerance.   Genitourinary: Negative for dysuria and hematuria.   Musculoskeletal: Negative for arthralgias and myalgias.   Skin: Negative  "for skin lesions.   Allergic/Immunologic: Positive for environmental allergies.   Neurological: Negative for tremors, seizures, syncope, speech difficulty, weakness, headache, memory problem and confusion.   Hematological: Does not bruise/bleed easily.   Psychiatric/Behavioral: Negative for sleep disturbance and depressed mood. The patient is not nervous/anxious.        Vital Signs  Vitals:    11/16/21 1119   BP: 130/99   BP Location: Left arm   Patient Position: Sitting   Cuff Size: Adult   Pulse: 71   Temp: 99.1 °F (37.3 °C)   TempSrc: Temporal   SpO2: 97%   Weight: 75.2 kg (165 lb 12.8 oz)   Height: 180.3 cm (70.98\")   PainSc: 0-No pain  Comment: only when coughing     Body mass index is 23.13 kg/m².    Labs  Lab on 08/23/2021   Component Date Value Ref Range Status   • Glucose 08/23/2021 102* 65 - 99 mg/dL Final   • BUN 08/23/2021 16  6 - 20 mg/dL Final   • Creatinine 08/23/2021 1.14  0.76 - 1.27 mg/dL Final   • Sodium 08/23/2021 142  136 - 145 mmol/L Final   • Potassium 08/23/2021 4.4  3.5 - 5.2 mmol/L Final   • Chloride 08/23/2021 104  98 - 107 mmol/L Final   • CO2 08/23/2021 26.6  22.0 - 29.0 mmol/L Final   • Calcium 08/23/2021 9.8  8.6 - 10.5 mg/dL Final   • Total Protein 08/23/2021 7.8  6.0 - 8.5 g/dL Final   • Albumin 08/23/2021 4.60  3.50 - 5.20 g/dL Final   • ALT (SGPT) 08/23/2021 14  1 - 41 U/L Final   • AST (SGOT) 08/23/2021 18  1 - 40 U/L Final   • Alkaline Phosphatase 08/23/2021 114  39 - 117 U/L Final   • Total Bilirubin 08/23/2021 0.6  0.0 - 1.2 mg/dL Final   • eGFR Non  Amer 08/23/2021 68  >60 mL/min/1.73 Final   • eGFR   Amer 08/23/2021 82  >60 mL/min/1.73 Final   • Globulin 08/23/2021 3.2  gm/dL Final   • A/G Ratio 08/23/2021 1.4  g/dL Final   • BUN/Creatinine Ratio 08/23/2021 14.0  7.0 - 25.0 Final   • Anion Gap 08/23/2021 11.4  5.0 - 15.0 mmol/L Final   • Total Cholesterol 08/23/2021 231* 0 - 200 mg/dL Final   • Triglycerides 08/23/2021 151* 0 - 150 mg/dL Final   • HDL " Cholesterol 08/23/2021 47  40 - 60 mg/dL Final   • LDL Cholesterol  08/23/2021 157* 0 - 100 mg/dL Final   • VLDL Cholesterol 08/23/2021 27  5 - 40 mg/dL Final   • LDL/HDL Ratio 08/23/2021 3.27   Final   • TSH 08/23/2021 1.100  0.270 - 4.200 uIU/mL Final   • 25 Hydroxy, Vitamin D 08/23/2021 27.6  ng/ml Final   • Microalbumin/Creatinine Ratio 08/23/2021    Final    Unable to calculate   • Creatinine, Urine 08/23/2021 180.7  mg/dL Final   • Microalbumin, Urine 08/23/2021 <1.2  mg/dL Final   • WBC 08/23/2021 6.69  3.40 - 10.80 10*3/mm3 Final   • RBC 08/23/2021 5.88* 4.14 - 5.80 10*6/mm3 Final   • Hemoglobin 08/23/2021 16.2  13.0 - 17.7 g/dL Final   • Hematocrit 08/23/2021 48.7  37.5 - 51.0 % Final   • MCV 08/23/2021 82.8  79.0 - 97.0 fL Final   • MCH 08/23/2021 27.6  26.6 - 33.0 pg Final   • MCHC 08/23/2021 33.3  31.5 - 35.7 g/dL Final   • RDW 08/23/2021 12.8  12.3 - 15.4 % Final   • RDW-SD 08/23/2021 37.9  37.0 - 54.0 fl Final   • MPV 08/23/2021 11.5  6.0 - 12.0 fL Final   • Platelets 08/23/2021 246  140 - 450 10*3/mm3 Final   • Neutrophil % 08/23/2021 59.7  42.7 - 76.0 % Final   • Lymphocyte % 08/23/2021 24.1  19.6 - 45.3 % Final   • Monocyte % 08/23/2021 7.8  5.0 - 12.0 % Final   • Eosinophil % 08/23/2021 7.2* 0.3 - 6.2 % Final   • Basophil % 08/23/2021 0.9  0.0 - 1.5 % Final   • Immature Grans % 08/23/2021 0.3  0.0 - 0.5 % Final   • Neutrophils, Absolute 08/23/2021 4.00  1.70 - 7.00 10*3/mm3 Final   • Lymphocytes, Absolute 08/23/2021 1.61  0.70 - 3.10 10*3/mm3 Final   • Monocytes, Absolute 08/23/2021 0.52  0.10 - 0.90 10*3/mm3 Final   • Eosinophils, Absolute 08/23/2021 0.48* 0.00 - 0.40 10*3/mm3 Final   • Basophils, Absolute 08/23/2021 0.06  0.00 - 0.20 10*3/mm3 Final   • Immature Grans, Absolute 08/23/2021 0.02  0.00 - 0.05 10*3/mm3 Final   • nRBC 08/23/2021 0.0  0.0 - 0.2 /100 WBC Final   • Color,  08/23/2021 Yellow  Yellow, Straw Final   • Appearance,  08/23/2021 Clear  Clear Final   • pH,  08/23/2021 6.0   5.0 - 8.0 Final   • Specific Gravity, UA 08/23/2021 1.024  1.005 - 1.030 Final   • Glucose, UA 08/23/2021 Negative  Negative Final   • Ketones, UA 08/23/2021 Negative  Negative Final   • Bilirubin, UA 08/23/2021 Negative  Negative Final   • Blood, UA 08/23/2021 Negative  Negative Final   • Protein, UA 08/23/2021 Negative  Negative Final   • Leuk Esterase, UA 08/23/2021 Negative  Negative Final   • Nitrite, UA 08/23/2021 Negative  Negative Final   • Urobilinogen, UA 08/23/2021 0.2 E.U./dL  0.2 - 1.0 E.U./dL Final   • RBC, UA 08/23/2021 0-2  None Seen, 0-2 /HPF Final   • WBC, UA 08/23/2021 0-2  None Seen, 0-2 /HPF Final   • Bacteria, UA 08/23/2021 None Seen  None Seen /HPF Final   • Squamous Epithelial Cells, UA 08/23/2021 0-2  None Seen, 0-2 /HPF Final   • Hyaline Casts, UA 08/23/2021 None Seen  None Seen /LPF Final   • Methodology 08/23/2021 Automated Microscopy   Final       Imaging  XR Chest PA & Lateral    Result Date: 11/1/2021  Opacifications in the right upper lobe inferior portion right midlung likely airspace disease such as bronchopneumonia without pleural effusion.  D:  11/01/2021 E:  11/01/2021  This report was finalized on 11/1/2021 3:42 PM by Dr. August Cabrera.          Current Outpatient Medications:   •  albuterol sulfate HFA (PROAIR HFA) 108 (90 Base) MCG/ACT inhaler, Inhale 2 puffs Every 4 (Four) Hours As Needed for Wheezing., Disp: 18 g, Rfl: 5  •  cholecalciferol (Vitamin D, Cholecalciferol,) 25 MCG (1000 UT) tablet, Take 1 tablet by mouth Daily., Disp: 60 tablet, Rfl: 5  •  clotrimazole-betamethasone (LOTRISONE) 1-0.05 % cream, Apply  topically to the appropriate area as directed 2 (Two) Times a Day As Needed (rash)., Disp: 45 g, Rfl: 1  •  famotidine (PEPCID) 20 MG tablet, Take 1 tablet by mouth 2 (Two) Times a Day As Needed., Disp: , Rfl:   •  fluticasone (FLONASE) 50 MCG/ACT nasal spray, 1 spray into the nostril(s) as directed by provider 2 (Two) Times a Day., Disp: 1 bottle, Rfl: 5  •   losartan (COZAAR) 50 MG tablet, Take 1 tablet by mouth Daily., Disp: 90 tablet, Rfl: 1  •  Fluticasone Furoate-Vilanterol (Breo Ellipta) 100-25 MCG/INH inhaler, Inhale 1 puff Daily., Disp: 1 each, Rfl: 3  •  montelukast (Singulair) 10 MG tablet, Take 1 tablet by mouth Every Night., Disp: 30 tablet, Rfl: 1    Physical Exam:    Physical Exam  Vitals reviewed.   Constitutional:       Appearance: Normal appearance. He is well-developed.   HENT:      Head: Normocephalic and atraumatic.      Right Ear: Hearing, tympanic membrane, ear canal and external ear normal.      Left Ear: Hearing, tympanic membrane, ear canal and external ear normal.      Nose: Nose normal.      Mouth/Throat:      Mouth: Mucous membranes are moist.      Pharynx: Oropharynx is clear. Uvula midline.   Eyes:      General: Lids are normal.      Conjunctiva/sclera: Conjunctivae normal.      Pupils: Pupils are equal, round, and reactive to light.   Cardiovascular:      Rate and Rhythm: Normal rate and regular rhythm.      Pulses: Normal pulses.      Heart sounds: Normal heart sounds.   Pulmonary:      Effort: Pulmonary effort is normal.      Breath sounds: Normal breath sounds. No wheezing.   Abdominal:      General: Bowel sounds are normal.      Palpations: Abdomen is soft.   Musculoskeletal:         General: Normal range of motion.      Cervical back: Full passive range of motion without pain, normal range of motion and neck supple.   Skin:     General: Skin is warm and dry.   Neurological:      General: No focal deficit present.      Mental Status: He is alert and oriented to person, place, and time. Mental status is at baseline.      Deep Tendon Reflexes: Reflexes are normal and symmetric.   Psychiatric:         Mood and Affect: Mood normal.         Speech: Speech normal.         Behavior: Behavior normal.         Thought Content: Thought content normal.         Judgment: Judgment normal.         Procedures        Assessment/Plan   Problem List Items  Addressed This Visit        Allergies and Adverse Reactions    Perennial allergic rhinitis with seasonal variation    Overview     11/16/2021 Katja Dalton PA-C    Sees Dr. Mackenzie Rousseau, allergist at . On immunotherapy.  Not using inhalers regularly.           Relevant Medications    fluticasone (FLONASE) 50 MCG/ACT nasal spray       Cardiac and Vasculature    Benign essential hypertension    Overview     11/16/2021 Katja Dalton PA-C    Continue losartan 50 mg daily.         Relevant Medications    losartan (COZAAR) 50 MG tablet       Gastrointestinal Abdominal     GERD without esophagitis    Overview     11/16/2021 Katja Dalton PA-C    Continue famotidine 20 mg daily.         Relevant Medications    famotidine (PEPCID) 20 MG tablet       Pulmonary and Pneumonias    Moderate persistent asthma with acute exacerbation    Overview     Breo samples given to patient.           Relevant Medications    albuterol sulfate HFA (PROAIR HFA) 108 (90 Base) MCG/ACT inhaler    Fluticasone Furoate-Vilanterol (Breo Ellipta) 100-25 MCG/INH inhaler    montelukast (Singulair) 10 MG tablet    Pneumonia of right middle lobe due to infectious organism - Primary    Overview     Recheck chest x-ray today.  CBC also included.         Relevant Medications    albuterol sulfate HFA (PROAIR HFA) 108 (90 Base) MCG/ACT inhaler    fluticasone (FLONASE) 50 MCG/ACT nasal spray    Fluticasone Furoate-Vilanterol (Breo Ellipta) 100-25 MCG/INH inhaler    montelukast (Singulair) 10 MG tablet    Other Relevant Orders    XR Chest PA & Lateral      Other Visit Diagnoses     Chills        Relevant Orders    CBC & Differential    TSH    T4, Free    T3, Free          Return if symptoms worsen or fail to improve.    Plan of care reviewed with patient at the conclusion of today's visit. Education was provided regarding diagnosis, management, and any prescribed or recommended OTC medications.Patient verbalizes understanding of and  agreement with management plan.     I spent 25 minutes caring for Perfetco on this date of service. This time includes time spent by me in the following activities:preparing for the visit, reviewing tests, obtaining and/or reviewing a separately obtained history, performing a medically appropriate examination and/or evaluation , counseling and educating the patient/family/caregiver, ordering medications, tests, or procedures, referring and communicating with other health care professionals  and documenting information in the medical record    Katja Dalton PA-C    Please note that portions of this note were completed with a voice recognition program.

## 2021-11-19 ENCOUNTER — TELEPHONE (OUTPATIENT)
Dept: INTERNAL MEDICINE | Facility: CLINIC | Age: 51
End: 2021-11-19

## 2021-11-19 DIAGNOSIS — J18.9 PNEUMONIA OF RIGHT MIDDLE LOBE DUE TO INFECTIOUS ORGANISM: Primary | ICD-10-CM

## 2021-11-19 RX ORDER — DOXYCYCLINE 100 MG/1
100 CAPSULE ORAL 2 TIMES DAILY
Qty: 20 CAPSULE | Refills: 0 | Status: SHIPPED | OUTPATIENT
Start: 2021-11-19 | End: 2022-02-23

## 2021-11-19 NOTE — TELEPHONE ENCOUNTER
PATIENT RETURNED CALL TO JACLYN. I READ THE MESSAGE FROM [facility] ABOUT TAKING THE DOXYCYCLINE. HE VERBALIZED UNDERSTANDING. I ALSO TOLD HIM HE WOULD GET A CALL IF [facility] HAD ANYTHING TO ADD.

## 2021-11-19 NOTE — TELEPHONE ENCOUNTER
Although we are waiting for the CT scan, I am also going to add doxycycline.  Prescription has been sent to pharmacy and I want him to start this now.

## 2021-12-02 ENCOUNTER — TELEPHONE (OUTPATIENT)
Dept: INTERNAL MEDICINE | Facility: CLINIC | Age: 51
End: 2021-12-02

## 2021-12-02 NOTE — TELEPHONE ENCOUNTER
[facility] called and stated that the CT order needs to be changed to CT Chest With. They said we need to make sure the PA is good with that. Then we have to refax the order to [phone] and call them to let them know at [phone].

## 2021-12-03 ENCOUNTER — TELEPHONE (OUTPATIENT)
Dept: INTERNAL MEDICINE | Facility: CLINIC | Age: 51
End: 2021-12-03

## 2021-12-03 ENCOUNTER — LAB (OUTPATIENT)
Dept: LAB | Facility: HOSPITAL | Age: 51
End: 2021-12-03

## 2021-12-03 DIAGNOSIS — J18.1 CONSOLIDATION OF RIGHT UPPER LOBE (HCC): ICD-10-CM

## 2021-12-03 DIAGNOSIS — R59.0 HILAR ADENOPATHY: ICD-10-CM

## 2021-12-03 DIAGNOSIS — R91.8 PULMONARY NODULES: Primary | ICD-10-CM

## 2021-12-03 LAB
BASOPHILS # BLD AUTO: 0.05 10*3/MM3 (ref 0–0.2)
BASOPHILS NFR BLD AUTO: 0.8 % (ref 0–1.5)
DEPRECATED RDW RBC AUTO: 36.5 FL (ref 37–54)
EOSINOPHIL # BLD AUTO: 0.1 10*3/MM3 (ref 0–0.4)
EOSINOPHIL NFR BLD AUTO: 1.7 % (ref 0.3–6.2)
ERYTHROCYTE [DISTWIDTH] IN BLOOD BY AUTOMATED COUNT: 13 % (ref 12.3–15.4)
ERYTHROCYTE [SEDIMENTATION RATE] IN BLOOD: 40 MM/HR (ref 0–20)
HCT VFR BLD AUTO: 40.2 % (ref 37.5–51)
HGB BLD-MCNC: 13.6 G/DL (ref 13–17.7)
IMM GRANULOCYTES # BLD AUTO: 0.01 10*3/MM3 (ref 0–0.05)
IMM GRANULOCYTES NFR BLD AUTO: 0.2 % (ref 0–0.5)
LYMPHOCYTES # BLD AUTO: 0.99 10*3/MM3 (ref 0.7–3.1)
LYMPHOCYTES NFR BLD AUTO: 16.4 % (ref 19.6–45.3)
MCH RBC QN AUTO: 26.9 PG (ref 26.6–33)
MCHC RBC AUTO-ENTMCNC: 33.8 G/DL (ref 31.5–35.7)
MCV RBC AUTO: 79.4 FL (ref 79–97)
MONOCYTES # BLD AUTO: 0.34 10*3/MM3 (ref 0.1–0.9)
MONOCYTES NFR BLD AUTO: 5.6 % (ref 5–12)
NEUTROPHILS NFR BLD AUTO: 4.53 10*3/MM3 (ref 1.7–7)
NEUTROPHILS NFR BLD AUTO: 75.3 % (ref 42.7–76)
NRBC BLD AUTO-RTO: 0 /100 WBC (ref 0–0.2)
PLATELET # BLD AUTO: 266 10*3/MM3 (ref 140–450)
PMV BLD AUTO: 11.6 FL (ref 6–12)
RBC # BLD AUTO: 5.06 10*6/MM3 (ref 4.14–5.8)
WBC NRBC COR # BLD: 6.02 10*3/MM3 (ref 3.4–10.8)

## 2021-12-03 PROCEDURE — 86480 TB TEST CELL IMMUN MEASURE: CPT

## 2021-12-03 PROCEDURE — 86140 C-REACTIVE PROTEIN: CPT

## 2021-12-03 PROCEDURE — 85652 RBC SED RATE AUTOMATED: CPT

## 2021-12-03 PROCEDURE — 85025 COMPLETE CBC W/AUTO DIFF WBC: CPT

## 2021-12-03 NOTE — TELEPHONE ENCOUNTER
We have to do a biopsy to figure out what it is.  Sometimes they can be done with a fine-needle going between ribs.  Sometimes they are done through the bronchoscope.    Malignancy could mean a lung cancer or a lymphoma.  However he is low risk for any kind of cancer.  As I said, I think it will turn out to be some sort of infection/inflammation.

## 2021-12-03 NOTE — TELEPHONE ENCOUNTER
Please call patient.  We finally obtained the CT results from .  It looks like he has a pneumonia in the right upper lobe.  Also has lymph nodes that are swollen on the right side of the chest.  I believe this is infectious, but he also has a 12 mm nodule in the left lower lobe.    We are doing a referral to the pulmonary doctor at  since he will need biopsies and probably a bronchoscopy to figure out if this is infection or if it is some kind of malignancy.      I also ordered some labs for him to go by any [facility] lab today or Monday to get.  We will be checking the blood cell counts again and checking TB.    Tell him to check his temperature twice a day and record the values.  Also note if he has any chills or night sweats or fatigue.

## 2021-12-03 NOTE — TELEPHONE ENCOUNTER
Caller: Rodrigo Garcia    Relationship: Self    Best call back number:     Caller requesting test results: RODRIGO     What test was performed: CT SCAN    When was the test performed: [id number]    Where was the test performed: [facility]    Additional notes: CONCERNED ABOUT RESULTS

## 2021-12-03 NOTE — TELEPHONE ENCOUNTER
Spoke with patient to go over questions due to report stating possible metastatic in left lower lobe of lung.  Provider indicated it may be infection but will have to r/o with pulmonary.  Also went over labs that need to be done - pt vu and will f/u appropriately

## 2021-12-04 LAB — CRP SERPL-MCNC: 0.54 MG/DL (ref 0–0.5)

## 2021-12-07 LAB
GAMMA INTERFERON BACKGROUND BLD IA-ACNC: 0.03 IU/ML
M TB IFN-G BLD-IMP: NEGATIVE
M TB IFN-G CD4+ BCKGRND COR BLD-ACNC: 0.02 IU/ML
M TB IFN-G CD4+CD8+ BCKGRND COR BLD-ACNC: 0.02 IU/ML
MITOGEN IGNF BLD-ACNC: >10 IU/ML
SERVICE CMNT-IMP: NORMAL

## 2021-12-08 DIAGNOSIS — J18.9 PNEUMONIA OF RIGHT MIDDLE LOBE DUE TO INFECTIOUS ORGANISM: ICD-10-CM

## 2021-12-21 DIAGNOSIS — Z12.11 ENCOUNTER FOR SCREENING COLONOSCOPY: Primary | ICD-10-CM

## 2022-01-05 ENCOUNTER — TELEPHONE (OUTPATIENT)
Dept: INTERNAL MEDICINE | Facility: CLINIC | Age: 52
End: 2022-01-05

## 2022-01-05 NOTE — TELEPHONE ENCOUNTER
RADAMES FROM DR YFN GIBBONS CALLED AND ASK FOR THE LAST CXR, CT SCAN AND 2 OFFICE NOTES TO BE FAXED -[phone].    I HAVE FAXED:  LAST 2 CXR, LABS AND LAST 2 OFFICE NOTES -[phone].  CALLED AND LET RADAMES KNOW THAT IT WAS FAXED.

## 2022-01-24 DIAGNOSIS — Z12.11 ENCOUNTER FOR SCREENING COLONOSCOPY: ICD-10-CM

## 2022-01-25 ENCOUNTER — TELEPHONE (OUTPATIENT)
Dept: INTERNAL MEDICINE | Facility: CLINIC | Age: 52
End: 2022-01-25

## 2022-01-25 NOTE — TELEPHONE ENCOUNTER
He could do a video visit with [facility] this afternoon.  Or he could do one with me at 415 or so.  I am doing Jonathan training at present

## 2022-02-23 ENCOUNTER — LAB (OUTPATIENT)
Dept: LAB | Facility: HOSPITAL | Age: 52
End: 2022-02-23

## 2022-02-23 ENCOUNTER — OFFICE VISIT (OUTPATIENT)
Dept: INTERNAL MEDICINE | Facility: CLINIC | Age: 52
End: 2022-02-23

## 2022-02-23 VITALS
OXYGEN SATURATION: 97 % | BODY MASS INDEX: 24.19 KG/M2 | WEIGHT: 172.8 LBS | TEMPERATURE: 98.2 F | SYSTOLIC BLOOD PRESSURE: 142 MMHG | HEIGHT: 71 IN | HEART RATE: 72 BPM | DIASTOLIC BLOOD PRESSURE: 98 MMHG

## 2022-02-23 DIAGNOSIS — E78.00 HYPERCHOLESTEROLEMIA: ICD-10-CM

## 2022-02-23 DIAGNOSIS — K21.9 GERD WITHOUT ESOPHAGITIS: ICD-10-CM

## 2022-02-23 DIAGNOSIS — I10 BENIGN ESSENTIAL HYPERTENSION: Primary | ICD-10-CM

## 2022-02-23 DIAGNOSIS — D80.9 IMMUNOGLOBULIN DEFICIENCY: Chronic | ICD-10-CM

## 2022-02-23 DIAGNOSIS — R21 RASH AND NONSPECIFIC SKIN ERUPTION: Chronic | ICD-10-CM

## 2022-02-23 DIAGNOSIS — J45.40 MODERATE PERSISTENT ASTHMA WITHOUT COMPLICATION: ICD-10-CM

## 2022-02-23 DIAGNOSIS — Z23 NEED FOR VACCINATION: ICD-10-CM

## 2022-02-23 DIAGNOSIS — J33.9 NASAL POLYPS: Chronic | ICD-10-CM

## 2022-02-23 DIAGNOSIS — F43.21 SITUATIONAL DEPRESSION: Chronic | ICD-10-CM

## 2022-02-23 DIAGNOSIS — K08.89 TOOTH ACHE: Chronic | ICD-10-CM

## 2022-02-23 DIAGNOSIS — B40.9 BLASTOMYCOSIS: Chronic | ICD-10-CM

## 2022-02-23 DIAGNOSIS — R91.8 PULMONARY NODULES: Chronic | ICD-10-CM

## 2022-02-23 DIAGNOSIS — A36.9: Chronic | ICD-10-CM

## 2022-02-23 LAB
CHOLEST SERPL-MCNC: 189 MG/DL (ref 0–200)
HDLC SERPL-MCNC: 55 MG/DL (ref 40–60)
LDLC SERPL CALC-MCNC: 122 MG/DL (ref 0–100)
LDLC/HDLC SERPL: 2.2 {RATIO}
TRIGL SERPL-MCNC: 64 MG/DL (ref 0–150)
VLDLC SERPL-MCNC: 12 MG/DL (ref 5–40)

## 2022-02-23 PROCEDURE — 80061 LIPID PANEL: CPT

## 2022-02-23 PROCEDURE — 99214 OFFICE O/P EST MOD 30 MIN: CPT | Performed by: INTERNAL MEDICINE

## 2022-02-23 RX ORDER — BUDESONIDE AND FORMOTEROL FUMARATE DIHYDRATE 160; 4.5 UG/1; UG/1
2 AEROSOL RESPIRATORY (INHALATION) 2 TIMES DAILY
COMMUNITY
Start: 2022-01-28

## 2022-02-23 RX ORDER — BUDESONIDE 0.5 MG/2ML
INHALANT ORAL
COMMUNITY
Start: 2021-11-16 | End: 2022-10-24

## 2022-02-23 RX ORDER — FAMOTIDINE 40 MG/1
TABLET, FILM COATED ORAL
COMMUNITY
Start: 2022-02-01 | End: 2022-02-23 | Stop reason: SDUPTHER

## 2022-02-23 RX ORDER — LINEZOLID 600 MG/1
TABLET, FILM COATED ORAL
COMMUNITY
Start: 2022-02-16 | End: 2022-10-24

## 2022-02-23 RX ORDER — DESVENLAFAXINE SUCCINATE 50 MG/1
50 TABLET, EXTENDED RELEASE ORAL DAILY
Qty: 30 TABLET | Refills: 5 | Status: SHIPPED | OUTPATIENT
Start: 2022-02-23 | End: 2022-10-24 | Stop reason: SDUPTHER

## 2022-02-23 RX ORDER — FAMOTIDINE 40 MG/1
40 TABLET, FILM COATED ORAL 2 TIMES DAILY
Qty: 180 TABLET | Refills: 1 | Status: SHIPPED | OUTPATIENT
Start: 2022-02-23

## 2022-02-23 RX ORDER — ITRACONAZOLE 100 MG/1
CAPSULE ORAL
Start: 2022-02-23 | End: 2023-01-17

## 2022-02-23 RX ORDER — AZITHROMYCIN 250 MG/1
TABLET, FILM COATED ORAL
COMMUNITY
Start: 2022-02-16 | End: 2022-10-24

## 2022-02-23 RX ORDER — ITRACONAZOLE 100 MG/1
CAPSULE ORAL
COMMUNITY
Start: 2022-02-16 | End: 2022-02-23 | Stop reason: SDUPTHER

## 2022-02-23 NOTE — ASSESSMENT & PLAN NOTE
- Nasal polypectomy was delayed due to patient having COVID-19 infection. He is scheduled to obtain a lung CT soon.   - Discussed nasal polypectomy helping with snoring and sleeping better as a result.   - Utilize Pulmicort nebulizer postoperatively as directed.   - Advised that he call ENT Dr. Whitmore to proceed with the nasal polypectomy.

## 2022-02-23 NOTE — ASSESSMENT & PLAN NOTE
- Patient reports developing depression and stress related to his new health issues.   - Begin taking desvenlafaxine 50 MG once daily. Prescription sent to his preferred pharmacy today. Advised that it will take 3 to 4 weeks for full therapeutic effect and before he starts noticing any changes. Informed him that any initial associated side effects should subside within 4 days. Patient to call with any lingering side effects or if his symptoms do not improve after 3 to 4 weeks. Patient was encouraged to ask his wife or a close friend for feedback.

## 2022-02-23 NOTE — ASSESSMENT & PLAN NOTE
- Last checked in 08/2021, which showed an increased LDL level of 157, previously 117.   - Will recheck fasting cholesterol levels today.   - Encouraged a healthy well balanced diet.   -Continue biking to work.  Walk some as well.

## 2022-02-23 NOTE — ASSESSMENT & PLAN NOTE
- Patient complains of acid reflux despite taking famotidine 20 MG in the evenings.  - Patient was advised to begin taking famotidine 20 MG twice daily now, once in the morning and once in the evening to help with persistent acid reflux symptoms.   -Avoid eating close to bedtime.

## 2022-02-23 NOTE — ASSESSMENT & PLAN NOTE
- His blood pressure in office today is 142/98, which he attributes to stress.  - Continue taking losartan 50mg daily.   - Encouraged that he monitor his blood pressure at home.

## 2022-02-24 ENCOUNTER — OUTSIDE FACILITY SERVICE (OUTPATIENT)
Dept: GASTROENTEROLOGY | Facility: CLINIC | Age: 52
End: 2022-02-24

## 2022-02-24 PROCEDURE — 45385 COLONOSCOPY W/LESION REMOVAL: CPT | Performed by: INTERNAL MEDICINE

## 2022-02-24 PROCEDURE — 45380 COLONOSCOPY AND BIOPSY: CPT | Performed by: INTERNAL MEDICINE

## 2022-02-24 PROCEDURE — 88305 TISSUE EXAM BY PATHOLOGIST: CPT | Performed by: INTERNAL MEDICINE

## 2022-02-24 NOTE — PATIENT INSTRUCTIONS
Patient Instructions   Problem List Items Addressed This Visit        Cardiac and Vasculature    Benign essential hypertension - Primary    Overview     Takes losartan 50mg daily.          Current Assessment & Plan     - His blood pressure in office today is 142/98, which he attributes to stress.  - Continue taking losartan 50mg daily.   - Encouraged that he monitor his blood pressure at home.          Relevant Medications    losartan (COZAAR) 50 MG tablet    Hypercholesterolemia    Current Assessment & Plan     - Last checked in 08/2021, which showed an increased LDL level of 157, previously 117.   - Will recheck fasting cholesterol levels today.   - Encouraged a healthy well balanced diet.   -Continue biking to work.  Walk some as well.         Relevant Orders    Lipid Panel (Completed)       ENT    Tooth ache (Chronic)    Overview     Patient reports teeth aching if he eats any simple sugars. Dental work up negative for any problems.         Nasal polyps (Chronic)    Current Assessment & Plan     - Nasal polypectomy was delayed due to patient having COVID-19 infection. He is scheduled to obtain a lung CT soon.   - Discussed nasal polypectomy helping with snoring and sleeping better as a result.   - Utilize Pulmicort nebulizer postoperatively as directed.   - Advised that he call ENT Dr. Whitmore to proceed with the nasal polypectomy.            Gastrointestinal Abdominal     GERD without esophagitis    Overview     Takes famotidine 20 mg daily.         Current Assessment & Plan     - Patient complains of acid reflux despite taking famotidine 20 MG in the evenings.  - Patient was advised to begin taking famotidine 20 MG twice daily now, once in the morning and once in the evening to help with persistent acid reflux symptoms.   -Avoid eating close to bedtime.         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 Kumari.  He prescribed azithromycin daily for 10 days and lynezolid.         Relevant Medications    azithromycin (ZITHROMAX) 250 MG tablet    Blastomycosis (Chronic)    Overview     Dr. Kumari found positive antibodies against Blastomycosis. He prescribed itraconazole to start after finishes azithromycin and lynezolid for staph/corynebacterium infection..           Relevant Medications    itraconazole (SPORANOX) 100 MG capsule       Mental Health    Situational depression (Chronic)    Current Assessment & Plan     - Patient reports developing depression and stress related to his new health issues.   - Begin taking desvenlafaxine 50 MG once daily. Prescription sent to his preferred pharmacy today. Advised that it will take 3 to 4 weeks for full therapeutic effect and before he starts noticing any changes. Informed him that any initial associated side effects should subside within 4 days. Patient to call with any lingering side effects or if his symptoms do not improve after 3 to 4 weeks. Patient was encouraged to ask his wife or a close friend for feedback.          Relevant Medications    desvenlafaxine (PRISTIQ) 50 MG 24 hr tablet       Multi-system (Lupus, Sarcoid...)    Immunoglobulin deficiency (HCC) (Chronic)    Overview     Low IgG3 and high IgG4.  Dr. Kumari referred patient to immunology.            Pulmonary and Pneumonias    Pulmonary nodules (Chronic)    Current Assessment & Plan     - Patient underwent extensive pulmonary work up. Status post bronchoscopy and biopsy showing necrotizing granuloma with inconsistent AFB results on the slides, history of pneumonia in 11/2021 treated with a azithromycin, lifetime non-smoker, no known exposures, diagnosed with Covid pneumonia mid January. Seeing pulmonologist  Abhijit Kumari MD. CT chest 12/20/2021 showed right upper lobe 3 cm nodule, 1 cm left lower lobe nodule, and 2 cm right  "lower lobe nodule, with multiple other nodules that were smaller with areas of possible cyst or emphysema.  - Continue utilizing Symbicort inhaler and ProAir inhaler as needed. Continue taking montelukast.  - Encouraged that he continue to bike to work and recommended that he begin walking for more exercise as well. Discussed building back up his endurance. Advised patient to utilize his ProAir inhaler prior to biking.   -Follow up CT scan soon.  - Follow up with pulmonology as advised.          Moderate persistent asthma without complication    Current Assessment & Plan     Continue symbicort twice a day.  Use albuterol inhaler as needed and before biking or walking.         Relevant Medications    albuterol sulfate HFA (PROAIR HFA) 108 (90 Base) MCG/ACT inhaler    montelukast (Singulair) 10 MG tablet    Symbicort 160-4.5 MCG/ACT inhaler    budesonide (PULMICORT) 0.5 MG/2ML nebulizer solution       Skin    Rash and nonspecific skin eruption (Chronic)    Overview     History of Red rash 2021 without pruritis scattered over his chest. Mainly in the central area where the hair is. No inflamed hair follicles noted on exam. Since then he has had intermittent rash in antecubital fossae, chest, and shins.               Current Assessment & Plan     May continue Lotrisone cream as needed.         Relevant Medications    clotrimazole-betamethasone (LOTRISONE) 1-0.05 % cream      Other Visit Diagnoses     Need for vaccination        Relevant Orders    Pneumococcal Conjugate Vaccine 13-Valent All             https://www.nhlbi.nih.gov/files/docs/public/heart/dash_brief.pdf\">   DASH Eating Plan  DASH stands for Dietary Approaches to Stop Hypertension. The DASH eating plan is a healthy eating plan that has been shown to:  · Reduce high blood pressure (hypertension).  · Reduce your risk for type 2 diabetes, heart disease, and stroke.  · Help with weight loss.  What are tips for following this plan?  Reading food labels  · Check " "food labels for the amount of salt (sodium) per serving. Choose foods with less than 5 percent of the Daily Value of sodium. Generally, foods with less than 300 milligrams (mg) of sodium per serving fit into this eating plan.  · To find whole grains, look for the word \"whole\" as the first word in the ingredient list.  Shopping  · Buy products labeled as \"low-sodium\" or \"no salt added.\"  · Buy fresh foods. Avoid canned foods and pre-made or frozen meals.  Cooking  · Avoid adding salt when cooking. Use salt-free seasonings or herbs instead of table salt or sea salt. Check with your health care provider or pharmacist before using salt substitutes.  · Do not cline foods. Cook foods using healthy methods such as baking, boiling, grilling, roasting, and broiling instead.  · Cook with heart-healthy oils, such as olive, canola, avocado, soybean, or sunflower oil.  Meal planning    · Eat a balanced diet that includes:  ? 4 or more servings of fruits and 4 or more servings of vegetables each day. Try to fill one-half of your plate with fruits and vegetables.  ? 6-8 servings of whole grains each day.  ? Less than 6 oz (170 g) of lean meat, poultry, or fish each day. A 3-oz (85-g) serving of meat is about the same size as a deck of cards. One egg equals 1 oz (28 g).  ? 2-3 servings of low-fat dairy each day. One serving is 1 cup (237 mL).  ? 1 serving of nuts, seeds, or beans 5 times each week.  ? 2-3 servings of heart-healthy fats. Healthy fats called omega-3 fatty acids are found in foods such as walnuts, flaxseeds, fortified milks, and eggs. These fats are also found in cold-water fish, such as sardines, salmon, and mackerel.  · Limit how much you eat of:  ? Canned or prepackaged foods.  ? Food that is high in trans fat, such as some fried foods.  ? Food that is high in saturated fat, such as fatty meat.  ? Desserts and other sweets, sugary drinks, and other foods with added sugar.  ? Full-fat dairy products.  · Do not salt " foods before eating.  · Do not eat more than 4 egg yolks a week.  · Try to eat at least 2 vegetarian meals a week.  · Eat more home-cooked food and less restaurant, buffet, and fast food.    Lifestyle  · When eating at a restaurant, ask that your food be prepared with less salt or no salt, if possible.  · If you drink alcohol:  ? Limit how much you use to:  § 0-1 drink a day for women who are not pregnant.  § 0-2 drinks a day for men.  ? Be aware of how much alcohol is in your drink. In the [place] one drink equals one 12 oz bottle of beer (355 mL), one 5 oz glass of wine (148 mL), or one 1½ oz glass of hard liquor (44 mL).  General information  · Avoid eating more than 2,300 mg of salt a day. If you have hypertension, you may need to reduce your sodium intake to 1,500 mg a day.  · Work with your health care provider to maintain a healthy body weight or to lose weight. Ask what an ideal weight is for you.  · Get at least 30 minutes of exercise that causes your heart to beat faster (aerobic exercise) most days of the week. Activities may include walking, swimming, or biking.  · Work with your health care provider or dietitian to adjust your eating plan to your individual calorie needs.  What foods should I eat?  Fruits  All fresh, dried, or frozen fruit. Canned fruit in natural juice (without added sugar).  Vegetables  Fresh or frozen vegetables (raw, steamed, roasted, or grilled). Low-sodium or reduced-sodium tomato and vegetable juice. Low-sodium or reduced-sodium tomato sauce and tomato paste. Low-sodium or reduced-sodium canned vegetables.  Grains  Whole-grain or whole-wheat bread. Whole-grain or whole-wheat pasta. Brown rice. Oatmeal. Quinoa. Bulgur. Whole-grain and low-sodium cereals. Sharlene bread. Low-fat, low-sodium crackers. Whole-wheat flour tortillas.  Meats and other proteins  Skinless chicken or turkey. Ground chicken or turkey. Pork with fat trimmed off. Fish and seafood. Egg whites. Dried beans, peas, or  lentils. Unsalted nuts, nut butters, and seeds. Unsalted canned beans. Lean cuts of beef with fat trimmed off. Low-sodium, lean precooked or cured meat, such as sausages or meat loaves.  Dairy  Low-fat (1%) or fat-free (skim) milk. Reduced-fat, low-fat, or fat-free cheeses. Nonfat, low-sodium ricotta or cottage cheese. Low-fat or nonfat yogurt. Low-fat, low-sodium cheese.  Fats and oils  Soft margarine without trans fats. Vegetable oil. Reduced-fat, low-fat, or light mayonnaise and salad dressings (reduced-sodium). Canola, safflower, olive, avocado, soybean, and sunflower oils. Avocado.  Seasonings and condiments  Herbs. Spices. Seasoning mixes without salt.  Other foods  Unsalted popcorn and pretzels. Fat-free sweets.  The items listed above may not be a complete list of foods and beverages you can eat. Contact a dietitian for more information.  What foods should I avoid?  Fruits  Canned fruit in a light or heavy syrup. Fried fruit. Fruit in cream or butter sauce.  Vegetables  Creamed or fried vegetables. Vegetables in a cheese sauce. Regular canned vegetables (not low-sodium or reduced-sodium). Regular canned tomato sauce and paste (not low-sodium or reduced-sodium). Regular tomato and vegetable juice (not low-sodium or reduced-sodium). Pickles. Olives.  Grains  Baked goods made with fat, such as croissants, muffins, or some breads. Dry pasta or rice meal packs.  Meats and other proteins  Fatty cuts of meat. Ribs. Fried meat. Grimaldo. Bologna, salami, and other precooked or cured meats, such as sausages or meat loaves. Fat from the back of a pig (fatback). Bratwurst. Salted nuts and seeds. Canned beans with added salt. Canned or smoked fish. Whole eggs or egg yolks. Chicken or turkey with skin.  Dairy  Whole or 2% milk, cream, and half-and-half. Whole or full-fat cream cheese. Whole-fat or sweetened yogurt. Full-fat cheese. Nondairy creamers. Whipped toppings. Processed cheese and cheese spreads.  Fats and  oils  Butter. Stick margarine. Lard. Shortening. Ghee. Grimaldo fat. Tropical oils, such as coconut, palm kernel, or palm oil.  Seasonings and condiments  Onion salt, garlic salt, seasoned salt, table salt, and sea salt. Worcestershire sauce. Tartar sauce. Barbecue sauce. Teriyaki sauce. Soy sauce, including reduced-sodium. Steak sauce. Canned and packaged gravies. Fish sauce. Oyster sauce. Cocktail sauce. Store-bought horseradish. Ketchup. Mustard. Meat flavorings and tenderizers. Bouillon cubes. Hot sauces. Pre-made or packaged marinades. Pre-made or packaged taco seasonings. Relishes. Regular salad dressings.  Other foods  Salted popcorn and pretzels.  The items listed above may not be a complete list of foods and beverages you should avoid. Contact a dietitian for more information.  Where to find more information  · National Heart, Lung, and Blood [place]: www.nhlbi.nih.gov  · American Heart Association: www.heart.org  · Academy of Nutrition and Dietetics: www.eatright.org  · National Kidney Foundation: www.kidney.org  Summary  · The DASH eating plan is a healthy eating plan that has been shown to reduce high blood pressure (hypertension). It may also reduce your risk for type 2 diabetes, heart disease, and stroke.  · When on the DASH eating plan, aim to eat more fresh fruits and vegetables, whole grains, lean proteins, low-fat dairy, and heart-healthy fats.  · With the DASH eating plan, you should limit salt (sodium) intake to 2,300 mg a day. If you have hypertension, you may need to reduce your sodium intake to 1,500 mg a day.  · Work with your health care provider or dietitian to adjust your eating plan to your individual calorie needs.  This information is not intended to replace advice given to you by your health care provider. Make sure you discuss any questions you have with your health care provider.  Document Revised: 11/20/2020 Document Reviewed: 11/20/2020  Elsevier Patient Education © 2021 [employer]  [employer]

## 2022-02-25 ENCOUNTER — LAB REQUISITION (OUTPATIENT)
Dept: LAB | Facility: HOSPITAL | Age: 52
End: 2022-02-25

## 2022-02-25 DIAGNOSIS — Z12.11 ENCOUNTER FOR SCREENING FOR MALIGNANT NEOPLASM OF COLON: ICD-10-CM

## 2022-02-25 DIAGNOSIS — D12.3 BENIGN NEOPLASM OF TRANSVERSE COLON: ICD-10-CM

## 2022-02-25 DIAGNOSIS — Z12.10 ENCOUNTER FOR SCREENING FOR MALIGNANT NEOPLASM OF INTESTINAL TRACT, UNSPECIFIED: ICD-10-CM

## 2022-02-25 DIAGNOSIS — K64.8 OTHER HEMORRHOIDS: ICD-10-CM

## 2022-02-25 DIAGNOSIS — D12.2 BENIGN NEOPLASM OF ASCENDING COLON: ICD-10-CM

## 2022-02-25 DIAGNOSIS — K57.30 DIVERTICULOSIS OF LARGE INTESTINE WITHOUT PERFORATION OR ABSCESS WITHOUT BLEEDING: ICD-10-CM

## 2022-02-25 DIAGNOSIS — D12.5 BENIGN NEOPLASM OF SIGMOID COLON: ICD-10-CM

## 2022-02-28 LAB
CYTO UR: NORMAL
LAB AP CASE REPORT: NORMAL
LAB AP CLINICAL INFORMATION: NORMAL
PATH REPORT.FINAL DX SPEC: NORMAL
PATH REPORT.GROSS SPEC: NORMAL

## 2022-03-23 ENCOUNTER — CLINICAL SUPPORT (OUTPATIENT)
Dept: INTERNAL MEDICINE | Facility: CLINIC | Age: 52
End: 2022-03-23

## 2022-03-23 DIAGNOSIS — Z23 NEED FOR VACCINATION: Primary | ICD-10-CM

## 2022-03-23 PROCEDURE — 90471 IMMUNIZATION ADMIN: CPT | Performed by: INTERNAL MEDICINE

## 2022-03-23 PROCEDURE — 90732 PPSV23 VACC 2 YRS+ SUBQ/IM: CPT | Performed by: INTERNAL MEDICINE

## 2022-06-15 RX ORDER — LOSARTAN POTASSIUM 100 MG/1
100 TABLET ORAL DAILY
Qty: 90 TABLET | Refills: 1 | Status: SHIPPED | OUTPATIENT
Start: 2022-06-15 | End: 2022-12-22 | Stop reason: SDUPTHER

## 2022-10-24 ENCOUNTER — OFFICE VISIT (OUTPATIENT)
Dept: INTERNAL MEDICINE | Facility: CLINIC | Age: 52
End: 2022-10-24

## 2022-10-24 VITALS
OXYGEN SATURATION: 96 % | TEMPERATURE: 98.2 F | DIASTOLIC BLOOD PRESSURE: 98 MMHG | SYSTOLIC BLOOD PRESSURE: 132 MMHG | HEIGHT: 71 IN | WEIGHT: 172 LBS | HEART RATE: 70 BPM | BODY MASS INDEX: 24.08 KG/M2

## 2022-10-24 DIAGNOSIS — J30.2 PERENNIAL ALLERGIC RHINITIS WITH SEASONAL VARIATION: ICD-10-CM

## 2022-10-24 DIAGNOSIS — J45.20 MILD INTERMITTENT ASTHMA WITHOUT COMPLICATION: ICD-10-CM

## 2022-10-24 DIAGNOSIS — J30.89 PERENNIAL ALLERGIC RHINITIS WITH SEASONAL VARIATION: ICD-10-CM

## 2022-10-24 DIAGNOSIS — R06.83 SNORING: ICD-10-CM

## 2022-10-24 DIAGNOSIS — F43.21 SITUATIONAL DEPRESSION: Chronic | ICD-10-CM

## 2022-10-24 DIAGNOSIS — I10 BENIGN ESSENTIAL HYPERTENSION: ICD-10-CM

## 2022-10-24 DIAGNOSIS — R05.1 ACUTE COUGH: ICD-10-CM

## 2022-10-24 DIAGNOSIS — J01.40 ACUTE NON-RECURRENT PANSINUSITIS: Primary | Chronic | ICD-10-CM

## 2022-10-24 PROBLEM — F43.9 STRESS AT HOME: Chronic | Status: ACTIVE | Noted: 2022-10-24

## 2022-10-24 LAB
EXPIRATION DATE: NORMAL
FLUAV AG UPPER RESP QL IA.RAPID: NOT DETECTED
FLUBV AG UPPER RESP QL IA.RAPID: NOT DETECTED
INTERNAL CONTROL: NORMAL
Lab: NORMAL
SARS-COV-2 AG UPPER RESP QL IA.RAPID: NOT DETECTED

## 2022-10-24 PROCEDURE — 87428 SARSCOV & INF VIR A&B AG IA: CPT | Performed by: INTERNAL MEDICINE

## 2022-10-24 PROCEDURE — 99214 OFFICE O/P EST MOD 30 MIN: CPT | Performed by: INTERNAL MEDICINE

## 2022-10-24 RX ORDER — MOMETASONE FUROATE 50 UG/1
2 SPRAY, METERED NASAL DAILY
Qty: 17 G | Refills: 12 | Status: SHIPPED | OUTPATIENT
Start: 2022-10-24 | End: 2022-11-16 | Stop reason: SDUPTHER

## 2022-10-24 RX ORDER — AMLODIPINE BESYLATE 2.5 MG/1
2.5 TABLET ORAL DAILY
Qty: 30 TABLET | Refills: 5 | Status: SHIPPED | OUTPATIENT
Start: 2022-10-24 | End: 2022-11-16 | Stop reason: SDUPTHER

## 2022-10-24 RX ORDER — SULFAMETHOXAZOLE AND TRIMETHOPRIM 800; 160 MG/1; MG/1
1 TABLET ORAL 2 TIMES DAILY
Qty: 20 TABLET | Refills: 0 | Status: SHIPPED | OUTPATIENT
Start: 2022-10-24 | End: 2022-11-03

## 2022-10-24 RX ORDER — DESVENLAFAXINE 100 MG/1
100 TABLET, EXTENDED RELEASE ORAL DAILY
Qty: 30 TABLET | Refills: 5 | Status: SHIPPED | OUTPATIENT
Start: 2022-10-24 | End: 2022-12-21 | Stop reason: SDUPTHER

## 2022-10-24 NOTE — ASSESSMENT & PLAN NOTE
- Continue Symbicort twice per day and montelukast daily.  - Continue albuterol inhaler, as needed.  - Follow up with Dr. Mackenzie Rousseau.       Statement Selected Statement Selected Statement Selected Statement Selected Statement Selected Statement Selected Statement Selected Statement Selected Statement Selected Statement Selected Statement Selected Statement Selected Statement Selected Statement Selected Statement Selected Statement Selected Statement Selected Statement Selected Statement Selected Statement Selected Statement Selected Statement Selected Statement Selected

## 2022-10-24 NOTE — ASSESSMENT & PLAN NOTE
- The patient will continue regular follow-up with Dr. Mackenzie Rousseau, allergist at .  - We will try Nasonex nasal spray instead of Flonase. He will use 2 sprays daily.  - He may continue using the over-the-counter antihistamine that he has and he will continue taking montelukast daily.  - Also recommend Neti Pot or other saline lavage daily.  - We will use Bactrim antibiotic twice per day for 10 days.  - He will continue using Symbicort inhaler twice per day and albuterol rescue inhaler as needed.

## 2022-10-24 NOTE — PATIENT INSTRUCTIONS
Patient Instructions   Problem List Items Addressed This Visit          Allergies and Adverse Reactions    Perennial allergic rhinitis with seasonal variation    Overview     Sees Dr. Mackenzie Rousseau, allergist at .          Current Assessment & Plan     - The patient will continue regular follow-up with Dr. Mackenzie Rousseau, allergist at .  - We will try Nasonex nasal spray instead of Flonase. He will use 2 sprays daily.  - He may continue using the over-the-counter antihistamine that he has and he will continue taking montelukast daily.  - Also recommend Neti Pot or other saline lavage daily.  - We will use Bactrim antibiotic twice per day for 10 days.  - He will continue using Symbicort inhaler twice per day and albuterol rescue inhaler as needed.            Cardiac and Vasculature    Benign essential hypertension    Overview     Takes losartan 100mg daily.          Current Assessment & Plan     - He will continue losartan daily.  - Add amlodipine 2.5 mg tablet daily.  - Continue to decrease salt in the diet.  - Continue regular exercise.         Relevant Medications    losartan (COZAAR) 100 MG tablet    amLODIPine (NORVASC) 2.5 MG tablet       ENT    Acute non-recurrent pansinusitis - Primary (Chronic)    Current Assessment & Plan     - The patient will continue regular follow-up with Dr. Mackenzie Rousseau, allergist at .  - We will try Nasonex nasal spray instead of Flonase. He will use 2 sprays daily.  - He may continue using the over-the-counter antihistamine that he has and he will continue taking montelukast daily.  - Also recommend Neti Pot or other saline lavage daily.  - We will use Bactrim antibiotic twice per day for 10 days.  - He will continue using Symbicort inhaler twice per day and albuterol rescue inhaler as needed.            Mental Health    Situational depression (Chronic)    Overview     Patient started desvenlafzxine 50mg tablet 8/2022.         Current Assessment & Plan     Increase desvenlafaxine to  100 mg daily.  100 mg tablet sent to the pharmacy.         Relevant Medications    desvenlafaxine (PRISTIQ) 100 MG 24 hr tablet       Pulmonary and Pneumonias    Mild intermittent asthma    Overview     Follow-up with Dr. Mackenzie Rousseau.         Current Assessment & Plan     - Continue Symbicort twice per day and montelukast daily.  - Continue albuterol inhaler, as needed.  - Follow up with Dr. Mackenzie Rousseau.             Relevant Medications    albuterol sulfate HFA (PROAIR HFA) 108 (90 Base) MCG/ACT inhaler    montelukast (Singulair) 10 MG tablet    Symbicort 160-4.5 MCG/ACT inhaler       Sleep    Snoring    Current Assessment & Plan     - He was advised to [facility] with [facility] and see if they can adjust his snore guard more to prevent TMJ pain.  - The patient was advised to use or to eat softer foods when the jaw is bothering him.            Other    Acute cough    Overview     8/23/2021 Chel Blakely MD    Refer to ENT for evaluation.      Continue seeing Dr. Mackenzie Rousseau, allergist at .    Continue taking famotidine twice a day to control acid reflux.  Resume singulair tablet (montelukast) every evening and Symbicort twice a day..    Continue using rescue inhaler as needed for shortness of breath/wheeze/cough.         Current Assessment & Plan     - See above.         Relevant Orders    POCT SARS-CoV-2 Antigen IKE + Flu (Completed)

## 2022-10-24 NOTE — ASSESSMENT & PLAN NOTE
- He will continue losartan daily.  - Add amlodipine 2.5 mg tablet daily.  - Continue to decrease salt in the diet.  - Continue regular exercise.

## 2022-10-24 NOTE — ASSESSMENT & PLAN NOTE
- He was advised to follow up with the  [facility] and see if they can adjust his snore guard more to prevent TMJ pain.  - The patient was advised to use or to eat softer foods when the jaw is bothering him.
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
## 2022-11-08 ENCOUNTER — TELEPHONE (OUTPATIENT)
Dept: INTERNAL MEDICINE | Facility: CLINIC | Age: 52
End: 2022-11-08

## 2022-11-08 NOTE — TELEPHONE ENCOUNTER
HATTIE WITH [facility] REQUESTING CALL BACK ON A PRIOR AUTHORIZATION. HE STATES HE SPOKE WITH SOMEONE IN CLINICAL BUT NO ENCOUNTER NOTE. PATIENT WAS SEEN IN OFFICE 10/24 FOR CONGESTION.  PLEASE CALL

## 2022-11-09 NOTE — TELEPHONE ENCOUNTER
Returned call on this. I swear the PA was approved but now I cannot find the approval. Resubmitting the PA.

## 2022-11-16 ENCOUNTER — OFFICE VISIT (OUTPATIENT)
Dept: INTERNAL MEDICINE | Facility: CLINIC | Age: 52
End: 2022-11-16

## 2022-11-16 VITALS
TEMPERATURE: 98.2 F | HEIGHT: 71 IN | OXYGEN SATURATION: 96 % | DIASTOLIC BLOOD PRESSURE: 88 MMHG | WEIGHT: 173.8 LBS | SYSTOLIC BLOOD PRESSURE: 122 MMHG | BODY MASS INDEX: 24.33 KG/M2 | HEART RATE: 83 BPM

## 2022-11-16 DIAGNOSIS — M26.621 ARTHRALGIA OF RIGHT TEMPOROMANDIBULAR JOINT: Chronic | ICD-10-CM

## 2022-11-16 DIAGNOSIS — R06.83 SNORING: ICD-10-CM

## 2022-11-16 DIAGNOSIS — J30.89 PERENNIAL ALLERGIC RHINITIS WITH SEASONAL VARIATION: ICD-10-CM

## 2022-11-16 DIAGNOSIS — Z23 NEED FOR VACCINATION: ICD-10-CM

## 2022-11-16 DIAGNOSIS — I10 BENIGN ESSENTIAL HYPERTENSION: Primary | ICD-10-CM

## 2022-11-16 DIAGNOSIS — K13.79 UVULAR HYPERTROPHY: Chronic | ICD-10-CM

## 2022-11-16 DIAGNOSIS — J30.2 PERENNIAL ALLERGIC RHINITIS WITH SEASONAL VARIATION: ICD-10-CM

## 2022-11-16 DIAGNOSIS — J33.9 NASAL POLYPS: Chronic | ICD-10-CM

## 2022-11-16 PROCEDURE — 99214 OFFICE O/P EST MOD 30 MIN: CPT | Performed by: INTERNAL MEDICINE

## 2022-11-16 PROCEDURE — 90750 HZV VACC RECOMBINANT IM: CPT | Performed by: INTERNAL MEDICINE

## 2022-11-16 RX ORDER — TRIAMCINOLONE ACETONIDE 55 UG/1
2 SPRAY, METERED NASAL DAILY
Qty: 16.5 G | Refills: 11 | Status: SHIPPED | OUTPATIENT
Start: 2022-11-16 | End: 2023-01-17

## 2022-11-16 RX ORDER — MOMETASONE FUROATE 50 UG/1
2 SPRAY, METERED NASAL DAILY
Qty: 17 G | Refills: 12 | Status: SHIPPED | OUTPATIENT
Start: 2022-11-16 | End: 2022-11-16

## 2022-11-16 RX ORDER — AMLODIPINE BESYLATE 2.5 MG/1
2.5 TABLET ORAL DAILY
Qty: 90 TABLET | Refills: 1 | Status: SHIPPED | OUTPATIENT
Start: 2022-11-16 | End: 2023-01-09 | Stop reason: SDUPTHER

## 2022-11-16 NOTE — PATIENT INSTRUCTIONS
Patient Instructions   Problem List Items Addressed This Visit          Allergies and Adverse Reactions    Perennial allergic rhinitis with seasonal variation    Overview     Sees Dr. Mackenzie Rousseau, allergist at .          Current Assessment & Plan     - The mometasone (Nasonex) nasal spray was declined by insurance.  - He will try the Nasacort nasal spray, 2 sprays in each nostril daily.  - He will follow up with Dr. Mackenzie Rousseau, allergist.            Cardiac and Vasculature    Benign essential hypertension - Primary    Overview     Takes amlodipine 2.5mg daily and losartan 100mg daily.          Current Assessment & Plan     - Continue 2.5 mg amlodipine and losartan 100 mg daily.  - Continue to avoid salt in the diet.  - Try to get some regular exercise.  - We could consider a combo tablet of amlodipine/benazepril in the future.         Relevant Medications    losartan (COZAAR) 100 MG tablet    amLODIPine (NORVASC) 2.5 MG tablet       ENT    Nasal polyps (Chronic)    Current Assessment & Plan     - He will follow up with ENT to schedule surgery.         Arthralgia of right temporomandibular joint (Chronic)    Current Assessment & Plan     - He will try to eat soft foods for a couple of weeks.  - Leave the bite guard out for a couple of weeks.  - Use a moist heat pack on the area. That will relax tight muscles.  - Take ibuprofen with food a couple of times a day for about 1 week.  - He will follow up with the dental clinic at  on the snoring guard tomorrow.         Uvular hypertrophy (Chronic)    Current Assessment & Plan     - He will follow up with the ENT to see about doing an uvuloplasty.            Sleep    Snoring    Current Assessment & Plan     - He will follow up with the dental clinic at  to see if his bite guard can be adjusted more to avoid pain in the right TMJ area.  - He will also follow up with the ear, nose, throat doctor to see if they could do an uvuloplasty at the same time as they do surgery  for his nasal polyps.          Other Visit Diagnoses       Need for vaccination        Relevant Orders    Shingrix Vaccine          Patient Instructions   Problem List Items Addressed This Visit          Allergies and Adverse Reactions    Perennial allergic rhinitis with seasonal variation    Overview     Sees Dr. Mackenzie Rousseau, allergist at .          Current Assessment & Plan     - The mometasone (Nasonex) nasal spray was declined by insurance.  - He will try the Nasacort nasal spray, 2 sprays in each nostril daily.  - He will follow up with Dr. Mackenzie Rousseau, allergist.            Cardiac and Vasculature    Benign essential hypertension - Primary    Overview     Takes amlodipine 2.5mg daily and losartan 100mg daily.          Current Assessment & Plan     - Continue 2.5 mg amlodipine and losartan 100 mg daily.  - Continue to avoid salt in the diet.  - Try to get some regular exercise.  - We could consider a combo tablet of amlodipine/benazepril in the future.         Relevant Medications    losartan (COZAAR) 100 MG tablet    amLODIPine (NORVASC) 2.5 MG tablet       ENT    Nasal polyps (Chronic)    Current Assessment & Plan     - He will follow up with ENT to schedule surgery.         Arthralgia of right temporomandibular joint (Chronic)    Current Assessment & Plan     - He will try to eat soft foods for a couple of weeks.  - Leave the bite guard out for a couple of weeks.  - Use a moist heat pack on the area. That will relax tight muscles.  - Take ibuprofen with food a couple of times a day for about 1 week.  - He will follow up with the dental clinic at  on the snoring guard tomorrow.         Uvular hypertrophy (Chronic)    Current Assessment & Plan     - He will follow up with the ENT to see about doing an uvuloplasty.            Sleep    Snoring    Current Assessment & Plan     - He will follow up with the dental clinic at  to see if his bite guard can be adjusted more to avoid pain in the right TMJ area.  -  He will also follow up with the ear, nose, throat doctor to see if they could do an uvuloplasty at the same time as they do surgery for his nasal polyps.          Other Visit Diagnoses       Need for vaccination        Relevant Orders    Shingrix Vaccine
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
## 2022-11-16 NOTE — ASSESSMENT & PLAN NOTE
- He will follow up with the dental clinic at  to see if his bite guard can be adjusted more to avoid pain in the right TMJ area.  - He will also follow up with the ear, nose, throat doctor to see if they could do an uvuloplasty at the same time as they do surgery for his nasal polyps.

## 2022-11-16 NOTE — ASSESSMENT & PLAN NOTE
- Continue 2.5 mg amlodipine and losartan 100 mg daily.  - Continue to avoid salt in the diet.  - Try to get some regular exercise.  - We could consider a combo tablet of amlodipine/benazepril in the future.

## 2022-11-16 NOTE — ASSESSMENT & PLAN NOTE
- He will try to eat soft foods for a couple of weeks.  - Leave the bite guard out for a couple of weeks.  - Use a moist heat pack on the area. That will relax tight muscles.  - Take ibuprofen with food a couple of times a day for about 1 week.  - He will follow up with the dental clinic at  on the snoring guard tomorrow.

## 2022-11-16 NOTE — ASSESSMENT & PLAN NOTE
- The mometasone (Nasonex) nasal spray was declined by insurance.  - He will try the Nasacort nasal spray, 2 sprays in each nostril daily.  - He will follow up with Dr. Mackenzie Rousseau, allergist.

## 2022-12-21 RX ORDER — DESVENLAFAXINE 100 MG/1
100 TABLET, EXTENDED RELEASE ORAL DAILY
Qty: 90 TABLET | Refills: 1 | Status: SHIPPED | OUTPATIENT
Start: 2022-12-21 | End: 2023-01-09 | Stop reason: SDUPTHER

## 2022-12-22 RX ORDER — LOSARTAN POTASSIUM 100 MG/1
100 TABLET ORAL DAILY
Qty: 90 TABLET | Refills: 1 | Status: SHIPPED | OUTPATIENT
Start: 2022-12-22 | End: 2023-01-09 | Stop reason: SDUPTHER

## 2022-12-22 NOTE — TELEPHONE ENCOUNTER
Rx Refill Note  Requested Prescriptions     Pending Prescriptions Disp Refills   • losartan (COZAAR) 100 MG tablet 90 tablet 1     Sig: Take 1 tablet by mouth Daily.      Last office visit with prescribing clinician: 11/16/2022   Last telemedicine visit with prescribing clinician: 1/17/2023   Next office visit with prescribing clinician: 1/17/2023                         Would you like a call back once the refill request has been completed: [] Yes [] No    If the office needs to give you a call back, can they leave a voicemail: [] Yes [] No    Peng Harvey MA  12/22/22, 15:43 EST

## 2023-01-09 RX ORDER — DESVENLAFAXINE 100 MG/1
100 TABLET, EXTENDED RELEASE ORAL DAILY
Qty: 90 TABLET | Refills: 1 | Status: SHIPPED | OUTPATIENT
Start: 2023-01-09 | End: 2023-03-10 | Stop reason: SDUPTHER

## 2023-01-09 RX ORDER — LOSARTAN POTASSIUM 100 MG/1
100 TABLET ORAL DAILY
Qty: 90 TABLET | Refills: 1 | Status: SHIPPED | OUTPATIENT
Start: 2023-01-09

## 2023-01-09 RX ORDER — AMLODIPINE BESYLATE 2.5 MG/1
2.5 TABLET ORAL DAILY
Qty: 90 TABLET | Refills: 1 | Status: SHIPPED | OUTPATIENT
Start: 2023-01-09

## 2023-01-09 NOTE — TELEPHONE ENCOUNTER
Rx Refill Note  Requested Prescriptions     Pending Prescriptions Disp Refills   • desvenlafaxine (PRISTIQ) 100 MG 24 hr tablet 90 tablet 1     Sig: Take 1 tablet by mouth Daily.   • losartan (COZAAR) 100 MG tablet 90 tablet 1     Sig: Take 1 tablet by mouth Daily.      Last office visit with prescribing clinician: 11/16/2022   Last telemedicine visit with prescribing clinician: 1/17/2023   Next office visit with prescribing clinician: 1/17/2023                         Would you like a call back once the refill request has been completed: [] Yes [] No    If the office needs to give you a call back, can they leave a voicemail: [] Yes [] No    Felicita Wei LPN  01/09/23, 10:40 EST

## 2023-01-17 ENCOUNTER — LAB (OUTPATIENT)
Dept: LAB | Facility: HOSPITAL | Age: 53
End: 2023-01-17
Payer: COMMERCIAL

## 2023-01-17 ENCOUNTER — OFFICE VISIT (OUTPATIENT)
Dept: INTERNAL MEDICINE | Facility: CLINIC | Age: 53
End: 2023-01-17
Payer: COMMERCIAL

## 2023-01-17 VITALS
TEMPERATURE: 97.7 F | HEIGHT: 72 IN | BODY MASS INDEX: 24.06 KG/M2 | SYSTOLIC BLOOD PRESSURE: 132 MMHG | WEIGHT: 177.6 LBS | OXYGEN SATURATION: 98 % | HEART RATE: 78 BPM | DIASTOLIC BLOOD PRESSURE: 62 MMHG

## 2023-01-17 DIAGNOSIS — Z11.59 ENCOUNTER FOR HEPATITIS C SCREENING TEST FOR LOW RISK PATIENT: ICD-10-CM

## 2023-01-17 DIAGNOSIS — F43.21 SITUATIONAL DEPRESSION: Chronic | ICD-10-CM

## 2023-01-17 DIAGNOSIS — Z00.00 ANNUAL PHYSICAL EXAM: Primary | ICD-10-CM

## 2023-01-17 DIAGNOSIS — E78.00 HYPERCHOLESTEROLEMIA: ICD-10-CM

## 2023-01-17 DIAGNOSIS — K21.9 GERD WITHOUT ESOPHAGITIS: ICD-10-CM

## 2023-01-17 DIAGNOSIS — D80.9 IMMUNOGLOBULIN DEFICIENCY: Chronic | ICD-10-CM

## 2023-01-17 DIAGNOSIS — J45.20 MILD INTERMITTENT ASTHMA WITHOUT COMPLICATION: ICD-10-CM

## 2023-01-17 DIAGNOSIS — Z23 NEED FOR VACCINATION: ICD-10-CM

## 2023-01-17 DIAGNOSIS — E55.9 VITAMIN D DEFICIENCY: ICD-10-CM

## 2023-01-17 DIAGNOSIS — I10 BENIGN ESSENTIAL HYPERTENSION: ICD-10-CM

## 2023-01-17 DIAGNOSIS — B40.9 BLASTOMYCOSIS: Chronic | ICD-10-CM

## 2023-01-17 LAB
25(OH)D3 SERPL-MCNC: 26.1 NG/ML (ref 30–100)
ALBUMIN SERPL-MCNC: 4.4 G/DL (ref 3.5–5.2)
ALBUMIN UR-MCNC: <1.2 MG/DL
ALBUMIN/GLOB SERPL: 1.5 G/DL
ALP SERPL-CCNC: 103 U/L (ref 39–117)
ALT SERPL W P-5'-P-CCNC: 23 U/L (ref 1–41)
ANION GAP SERPL CALCULATED.3IONS-SCNC: 12.4 MMOL/L (ref 5–15)
AST SERPL-CCNC: 25 U/L (ref 1–40)
BACTERIA UR QL AUTO: NORMAL /HPF
BASOPHILS # BLD AUTO: 0.04 10*3/MM3 (ref 0–0.2)
BASOPHILS NFR BLD AUTO: 0.7 % (ref 0–1.5)
BILIRUB SERPL-MCNC: 0.5 MG/DL (ref 0–1.2)
BILIRUB UR QL STRIP: NEGATIVE
BUN SERPL-MCNC: 17 MG/DL (ref 6–20)
BUN/CREAT SERPL: 15.5 (ref 7–25)
CALCIUM SPEC-SCNC: 9.5 MG/DL (ref 8.6–10.5)
CHLORIDE SERPL-SCNC: 104 MMOL/L (ref 98–107)
CHOLEST SERPL-MCNC: 231 MG/DL (ref 0–200)
CLARITY UR: CLEAR
CO2 SERPL-SCNC: 25.6 MMOL/L (ref 22–29)
COLOR UR: YELLOW
CREAT SERPL-MCNC: 1.1 MG/DL (ref 0.76–1.27)
CREAT UR-MCNC: 171 MG/DL
DEPRECATED RDW RBC AUTO: 36.5 FL (ref 37–54)
EGFRCR SERPLBLD CKD-EPI 2021: 80.3 ML/MIN/1.73
EOSINOPHIL # BLD AUTO: 0.27 10*3/MM3 (ref 0–0.4)
EOSINOPHIL NFR BLD AUTO: 4.8 % (ref 0.3–6.2)
ERYTHROCYTE [DISTWIDTH] IN BLOOD BY AUTOMATED COUNT: 12.4 % (ref 12.3–15.4)
GLOBULIN UR ELPH-MCNC: 3 GM/DL
GLUCOSE SERPL-MCNC: 104 MG/DL (ref 65–99)
GLUCOSE UR STRIP-MCNC: NEGATIVE MG/DL
HCT VFR BLD AUTO: 44.6 % (ref 37.5–51)
HCV AB SER DONR QL: NORMAL
HDLC SERPL-MCNC: 52 MG/DL (ref 40–60)
HGB BLD-MCNC: 15.3 G/DL (ref 13–17.7)
HGB UR QL STRIP.AUTO: NEGATIVE
HYALINE CASTS UR QL AUTO: NORMAL /LPF
IMM GRANULOCYTES # BLD AUTO: 0.01 10*3/MM3 (ref 0–0.05)
IMM GRANULOCYTES NFR BLD AUTO: 0.2 % (ref 0–0.5)
KETONES UR QL STRIP: NEGATIVE
LDLC SERPL CALC-MCNC: 158 MG/DL (ref 0–100)
LDLC/HDLC SERPL: 2.99 {RATIO}
LEUKOCYTE ESTERASE UR QL STRIP.AUTO: NEGATIVE
LYMPHOCYTES # BLD AUTO: 1.13 10*3/MM3 (ref 0.7–3.1)
LYMPHOCYTES NFR BLD AUTO: 20.1 % (ref 19.6–45.3)
MCH RBC QN AUTO: 27.8 PG (ref 26.6–33)
MCHC RBC AUTO-ENTMCNC: 34.3 G/DL (ref 31.5–35.7)
MCV RBC AUTO: 81.1 FL (ref 79–97)
MICROALBUMIN/CREAT UR: NORMAL MG/G{CREAT}
MONOCYTES # BLD AUTO: 0.32 10*3/MM3 (ref 0.1–0.9)
MONOCYTES NFR BLD AUTO: 5.7 % (ref 5–12)
NEUTROPHILS NFR BLD AUTO: 3.85 10*3/MM3 (ref 1.7–7)
NEUTROPHILS NFR BLD AUTO: 68.5 % (ref 42.7–76)
NITRITE UR QL STRIP: NEGATIVE
NRBC BLD AUTO-RTO: 0 /100 WBC (ref 0–0.2)
PH UR STRIP.AUTO: 7 [PH] (ref 5–8)
PLATELET # BLD AUTO: 245 10*3/MM3 (ref 140–450)
PMV BLD AUTO: 11.3 FL (ref 6–12)
POTASSIUM SERPL-SCNC: 4.4 MMOL/L (ref 3.5–5.2)
PROT SERPL-MCNC: 7.4 G/DL (ref 6–8.5)
PROT UR QL STRIP: ABNORMAL
RBC # BLD AUTO: 5.5 10*6/MM3 (ref 4.14–5.8)
RBC # UR STRIP: NORMAL /HPF
REF LAB TEST METHOD: NORMAL
SODIUM SERPL-SCNC: 142 MMOL/L (ref 136–145)
SP GR UR STRIP: 1.02 (ref 1–1.03)
SQUAMOUS #/AREA URNS HPF: NORMAL /HPF
TRIGL SERPL-MCNC: 117 MG/DL (ref 0–150)
TSH SERPL DL<=0.05 MIU/L-ACNC: 0.74 UIU/ML (ref 0.27–4.2)
UROBILINOGEN UR QL STRIP: ABNORMAL
VLDLC SERPL-MCNC: 21 MG/DL (ref 5–40)
WBC # UR STRIP: NORMAL /HPF
WBC NRBC COR # BLD: 5.62 10*3/MM3 (ref 3.4–10.8)

## 2023-01-17 PROCEDURE — 90471 IMMUNIZATION ADMIN: CPT | Performed by: INTERNAL MEDICINE

## 2023-01-17 PROCEDURE — 99396 PREV VISIT EST AGE 40-64: CPT | Performed by: INTERNAL MEDICINE

## 2023-01-17 PROCEDURE — 82306 VITAMIN D 25 HYDROXY: CPT

## 2023-01-17 PROCEDURE — 82570 ASSAY OF URINE CREATININE: CPT

## 2023-01-17 PROCEDURE — 80061 LIPID PANEL: CPT

## 2023-01-17 PROCEDURE — 86803 HEPATITIS C AB TEST: CPT

## 2023-01-17 PROCEDURE — 90750 HZV VACC RECOMBINANT IM: CPT | Performed by: INTERNAL MEDICINE

## 2023-01-17 PROCEDURE — 81001 URINALYSIS AUTO W/SCOPE: CPT

## 2023-01-17 PROCEDURE — 82043 UR ALBUMIN QUANTITATIVE: CPT

## 2023-01-17 PROCEDURE — 80050 GENERAL HEALTH PANEL: CPT

## 2023-01-17 PROCEDURE — 93000 ELECTROCARDIOGRAM COMPLETE: CPT | Performed by: INTERNAL MEDICINE

## 2023-01-17 RX ORDER — AZELASTINE 1 MG/ML
2 SPRAY, METERED NASAL
COMMUNITY
Start: 2022-03-16 | End: 2023-01-17

## 2023-01-17 RX ORDER — MELOXICAM 7.5 MG/1
TABLET ORAL
COMMUNITY
Start: 2022-07-28 | End: 2023-01-17
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
## 2023-01-17 NOTE — ASSESSMENT & PLAN NOTE
Continue Symbicort inhaler twice daily and albuterol inhaler as needed and before exercise. Continue taking montelukast nightly. Continue regular follow up with Dr. Mackenzie Rousseau.

## 2023-01-17 NOTE — PATIENT INSTRUCTIONS
Patient Instructions   Problem List Items Addressed This Visit          Cardiac and Vasculature    Benign essential hypertension    Overview     Takes amlodipine 2.5mg daily and losartan 100mg daily.          Current Assessment & Plan     Continue amlodipine and losartan. Continue to avoid salt in the diet and get some regular exercise.           Relevant Medications    losartan (COZAAR) 100 MG tablet    amLODIPine (NORVASC) 2.5 MG tablet    Other Relevant Orders    ECG 12 Lead    CBC & Differential    Comprehensive Metabolic Panel    Microalbumin / Creatinine Urine Ratio - Urine, Clean Catch    Urinalysis With Microscopic - Urine, Clean Catch    Hypercholesterolemia    Current Assessment & Plan     Continue to avoid sugars and fats in the diet. Try to get some exercise 5 days a week.         Relevant Orders    Lipid Panel    TSH       Gastrointestinal Abdominal     GERD without esophagitis    Overview     Takes famotidine 40 mg once or twice daily.         Current Assessment & Plan     He will continue taking famotidine every morning. Take a second tablet at lunchtime to see if that helps with the reflux in the afternoon.         Relevant Medications    famotidine (PEPCID) 40 MG tablet       Infectious Diseases    Blastomycosis (Chronic)    Overview     Dr. Kumari found positive antibodies against Blastomycosis. He prescribed itraconazole to start after finishes azithromycin and lynezolid for staph/corynebacterium infection..           Current Assessment & Plan     He has finished itraconazole. He will follow up with Dr. Kumari in 02/2023.            Mental Health    Situational depression (Chronic)    Overview     Patient started desvenlafzxine tablet 8/2022.  Taking 100mg daily.         Current Assessment & Plan     Continue taking desvenlafaxine daily. Exercise and physical activity also help decrease symptoms of stress, anxiety, and mood.         Relevant Medications    desvenlafaxine (PRISTIQ) 100 MG 24 hr  tablet       Multi-system (Lupus, Sarcoid...)    Immunoglobulin deficiency (HCC) (Chronic)    Overview     Low IgG3 and high IgG4.  Dr. Kumari referred patient to immunology.  Seeing Dr. Mackenzie Rousseau.         Current Assessment & Plan     Continue follow-up with Dr. Mackenzie Rousseau.            Pulmonary and Pneumonias    Mild intermittent asthma    Overview     Follow-up with Dr. Mackenzie Rousseau.         Current Assessment & Plan     Continue Symbicort inhaler twice daily and albuterol inhaler as needed and before exercise. Continue taking montelukast nightly. Continue regular follow up with Dr. Mackenzie Rousseau.             Relevant Medications    albuterol sulfate HFA (PROAIR HFA) 108 (90 Base) MCG/ACT inhaler    montelukast (Singulair) 10 MG tablet    Symbicort 160-4.5 MCG/ACT inhaler     Other Visit Diagnoses       Annual physical exam    -  Primary    Need for vaccination        Relevant Orders    Shingrix Vaccine (Completed)    Encounter for hepatitis C screening test for low risk patient        Relevant Orders    Hepatitis C Antibody    Vitamin D deficiency        Relevant Orders    Vitamin D,25-Hydroxy

## 2023-01-17 NOTE — ASSESSMENT & PLAN NOTE
He will continue taking famotidine every morning. Take a second tablet at lunchtime to see if that helps with the reflux in the afternoon.

## 2023-01-17 NOTE — ASSESSMENT & PLAN NOTE
Continue amlodipine and losartan. Continue to avoid salt in the diet and get some regular exercise.

## 2023-01-17 NOTE — ASSESSMENT & PLAN NOTE
Continue taking desvenlafaxine daily. Exercise and physical activity also help decrease symptoms of stress, anxiety, and mood.

## 2023-03-10 RX ORDER — DESVENLAFAXINE SUCCINATE 50 MG/1
50 TABLET, EXTENDED RELEASE ORAL DAILY
Qty: 90 TABLET | Refills: 1 | Status: SHIPPED | OUTPATIENT
Start: 2023-03-10

## 2023-04-13 RX ORDER — FAMOTIDINE 40 MG/1
TABLET, FILM COATED ORAL
Qty: 180 TABLET | Refills: 1 | Status: SHIPPED | OUTPATIENT
Start: 2023-04-13 | End: 2023-04-18 | Stop reason: SDUPTHER

## 2023-04-18 RX ORDER — FAMOTIDINE 40 MG/1
40 TABLET, FILM COATED ORAL 2 TIMES DAILY
Qty: 180 TABLET | Refills: 1 | Status: SHIPPED | OUTPATIENT
Start: 2023-04-18

## 2023-07-28 ENCOUNTER — LAB (OUTPATIENT)
Dept: LAB | Facility: HOSPITAL | Age: 53
End: 2023-07-28
Payer: COMMERCIAL

## 2023-07-28 ENCOUNTER — OFFICE VISIT (OUTPATIENT)
Dept: INTERNAL MEDICINE | Facility: CLINIC | Age: 53
End: 2023-07-28
Payer: COMMERCIAL

## 2023-07-28 VITALS
HEIGHT: 72 IN | WEIGHT: 172 LBS | OXYGEN SATURATION: 96 % | SYSTOLIC BLOOD PRESSURE: 140 MMHG | HEART RATE: 67 BPM | DIASTOLIC BLOOD PRESSURE: 72 MMHG | BODY MASS INDEX: 23.3 KG/M2 | TEMPERATURE: 98.2 F

## 2023-07-28 DIAGNOSIS — J45.20 MILD INTERMITTENT ASTHMA WITHOUT COMPLICATION: ICD-10-CM

## 2023-07-28 DIAGNOSIS — I10 BENIGN ESSENTIAL HYPERTENSION: Primary | ICD-10-CM

## 2023-07-28 DIAGNOSIS — R91.8 RIGHT LOWER LOBE LUNG MASS: ICD-10-CM

## 2023-07-28 DIAGNOSIS — J33.9 NASAL POLYPS: Chronic | ICD-10-CM

## 2023-07-28 DIAGNOSIS — I10 BENIGN ESSENTIAL HYPERTENSION: ICD-10-CM

## 2023-07-28 DIAGNOSIS — E78.00 HYPERCHOLESTEROLEMIA: ICD-10-CM

## 2023-07-28 DIAGNOSIS — F43.21 SITUATIONAL DEPRESSION: Chronic | ICD-10-CM

## 2023-07-28 DIAGNOSIS — K21.9 GERD WITHOUT ESOPHAGITIS: ICD-10-CM

## 2023-07-28 DIAGNOSIS — Z23 NEED FOR VACCINATION: ICD-10-CM

## 2023-07-28 DIAGNOSIS — R21 RASH AND NONSPECIFIC SKIN ERUPTION: Chronic | ICD-10-CM

## 2023-07-28 LAB
ALBUMIN SERPL-MCNC: 4.7 G/DL (ref 3.5–5.2)
ALBUMIN UR-MCNC: <1.2 MG/DL
ALBUMIN/GLOB SERPL: 1.8 G/DL
ALP SERPL-CCNC: 115 U/L (ref 39–117)
ALT SERPL W P-5'-P-CCNC: 18 U/L (ref 1–41)
ANION GAP SERPL CALCULATED.3IONS-SCNC: 11 MMOL/L (ref 5–15)
AST SERPL-CCNC: 20 U/L (ref 1–40)
BILIRUB SERPL-MCNC: 0.5 MG/DL (ref 0–1.2)
BILIRUB UR QL STRIP: NEGATIVE
BUN SERPL-MCNC: 11 MG/DL (ref 6–20)
BUN/CREAT SERPL: 11.1 (ref 7–25)
CALCIUM SPEC-SCNC: 9.8 MG/DL (ref 8.6–10.5)
CHLORIDE SERPL-SCNC: 105 MMOL/L (ref 98–107)
CHOLEST SERPL-MCNC: 258 MG/DL (ref 0–200)
CLARITY UR: ABNORMAL
CO2 SERPL-SCNC: 26 MMOL/L (ref 22–29)
COLOR UR: YELLOW
CREAT SERPL-MCNC: 0.99 MG/DL (ref 0.76–1.27)
CREAT UR-MCNC: 121.3 MG/DL
EGFRCR SERPLBLD CKD-EPI 2021: 91.1 ML/MIN/1.73
GLOBULIN UR ELPH-MCNC: 2.6 GM/DL
GLUCOSE SERPL-MCNC: 96 MG/DL (ref 65–99)
GLUCOSE UR STRIP-MCNC: NEGATIVE MG/DL
HDLC SERPL-MCNC: 58 MG/DL (ref 40–60)
HGB UR QL STRIP.AUTO: NEGATIVE
KETONES UR QL STRIP: NEGATIVE
LDLC SERPL CALC-MCNC: 174 MG/DL (ref 0–100)
LDLC/HDLC SERPL: 2.95 {RATIO}
LEUKOCYTE ESTERASE UR QL STRIP.AUTO: NEGATIVE
MICROALBUMIN/CREAT UR: NORMAL MG/G{CREAT}
NITRITE UR QL STRIP: NEGATIVE
PH UR STRIP.AUTO: 8 [PH] (ref 5–8)
POTASSIUM SERPL-SCNC: 4.2 MMOL/L (ref 3.5–5.2)
PROT SERPL-MCNC: 7.3 G/DL (ref 6–8.5)
PROT UR QL STRIP: NEGATIVE
SODIUM SERPL-SCNC: 142 MMOL/L (ref 136–145)
SP GR UR STRIP: 1.02 (ref 1–1.03)
TRIGL SERPL-MCNC: 144 MG/DL (ref 0–150)
UROBILINOGEN UR QL STRIP: ABNORMAL
VLDLC SERPL-MCNC: 26 MG/DL (ref 5–40)

## 2023-07-28 PROCEDURE — 85025 COMPLETE CBC W/AUTO DIFF WBC: CPT

## 2023-07-28 PROCEDURE — 80061 LIPID PANEL: CPT

## 2023-07-28 PROCEDURE — 82043 UR ALBUMIN QUANTITATIVE: CPT

## 2023-07-28 PROCEDURE — 80053 COMPREHEN METABOLIC PANEL: CPT

## 2023-07-28 PROCEDURE — 82570 ASSAY OF URINE CREATININE: CPT

## 2023-07-28 PROCEDURE — 81001 URINALYSIS AUTO W/SCOPE: CPT

## 2023-07-28 RX ORDER — DESVENLAFAXINE 25 MG/1
25 TABLET, EXTENDED RELEASE ORAL DAILY
Qty: 30 TABLET | Refills: 5 | Status: SHIPPED | OUTPATIENT
Start: 2023-07-28

## 2023-07-28 RX ORDER — OLMESARTAN MEDOXOMIL 40 MG/1
40 TABLET ORAL DAILY
Qty: 90 TABLET | Refills: 1 | Status: SHIPPED | OUTPATIENT
Start: 2023-07-28

## 2023-07-28 NOTE — ASSESSMENT & PLAN NOTE
He will see a dermatologist to evaluate the rash. It is possibly related to a prior fungal infection.

## 2023-07-28 NOTE — ASSESSMENT & PLAN NOTE
He is status post sinus and nasal surgery to remove polyps on 06/13/2023. He is feeling much better and can breathe through the nose better. He feels his sense of smell is improving.

## 2023-07-28 NOTE — ASSESSMENT & PLAN NOTE
He will continue famotidine as needed. He will continue to avoid eating close to bedtime and avoid large meals.
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
## 2023-07-28 NOTE — PATIENT INSTRUCTIONS
Patient Instructions  Problem List Items Addressed This Visit          Cardiac and Vasculature    Benign essential hypertension - Primary    Overview     Takes amlodipine 2.5mg daily and losartan 100mg daily.     We did orthostatic blood pressures and pulses. His blood pressure was 138/86 mmHg lying, went up to 150/80 mmHg sitting, and up to 150/88 mmHg standing. The heart rate changed from 64 beats per minute when lying down to 68 beats per minute when standing. He is not orthostatic today. The lightheadedness when he is bending over, standing back up, and changing positions when working may be a side effect of losartan. It does not happen when he does not take the losartan. We will change losartan to olmesartan 40 mg tablet once a day. He will also pay attention to being well hydrated, especially when working outside. He will monitor his blood pressure at home and let us know if it is not running around 120/70 mmHg.         Relevant Medications    amLODIPine (NORVASC) 2.5 MG tablet    olmesartan (BENICAR) 40 MG tablet    Other Relevant Orders    Comprehensive Metabolic Panel    CBC & Differential    Urinalysis With Microscopic - Urine, Clean Catch    Microalbumin / Creatinine Urine Ratio - Urine, Clean Catch    Hypercholesterolemia    Overview     He will continue to decrease fats and sugar in the diet and will continue to increase physical activity and exercise.           Relevant Orders    Lipid Panel       ENT    Nasal polyps (Chronic)    Current Assessment & Plan     He is status post sinus and nasal surgery to remove polyps on 06/13/2023. He is feeling much better and can breathe through the nose better. He feels his sense of smell is improving.              Gastrointestinal Abdominal     GERD without esophagitis    Overview     Takes famotidine 40 mg once or twice daily.         Current Assessment & Plan     He will continue famotidine as needed. He will continue to avoid eating close to bedtime and avoid  large meals.         Relevant Medications    famotidine (PEPCID) 40 MG tablet       Mental Health    Situational depression (Chronic)    Overview     Patient started desvenlafzxine tablet 8/2022.    Since he is having some mild sexual side effects, we will try decreasing the desvenlafaxine to 25 mg daily. I have encouraged him to continue it to help with stress. We discussed how to wean off of it slowly if he decides to do so.         Relevant Medications    desvenlafaxine 25 MG tablet sustained-release 24 hour       Pulmonary and Pneumonias    Mild intermittent asthma    Overview     Follow-up with Dr. Mackenzie Rousseau.    He will continue trying to use the Symbicort inhaler 2 times a day. He will continue taking montelukast daily. He will continue using the albuterol as needed.           Relevant Medications    albuterol sulfate HFA (PROAIR HFA) 108 (90 Base) MCG/ACT inhaler    montelukast (Singulair) 10 MG tablet    Symbicort 160-4.5 MCG/ACT inhaler    Right lower lobe lung mass    Overview     Saw Dr. Rodríguez 5/2023 for f/u.A?P from his note as follows:  #R lung masses (RUL and RLL), enlarging on CT chest  Prior EBUS of lymph node positive for necrotizing granuloma with negative fungal stains. He also has mild bronchiectasis and multiple pulmonary cysts (unclear etiology). ANCA neg, Quant TB negative. 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 Unfortunately despite this his RLL nodule/mass was much larger in size on repeat imaging. Underwent RML resection 3/2023, which demonstrated granulomatous disease and vasculitic changes. Fungal etiology remains possible, but ANCA-negative vasculitis is leading suspicion.  -biopsy cultures negative to date but will allow one more month for fungal cultures to finalize  -will consider re-treatment with azole therapy for possible blastomycosis  -hold treatment for possible ANCA negative vasculitis: need cultures to finalize  first  -will send biopsy slide to [facility] for second opinion              Skin    Rash and nonspecific skin eruption (Chronic)    Overview     History of Red rash 2021 without pruritis scattered over his chest. Mainly in the central area where the hair is. No inflamed hair follicles noted on exam. In the winter he also had intermittent rash in antecubital fossae, and shins.  Today faintly red papular rash on the chest and abdomen which is nonpruritic.               Current Assessment & Plan     He will see a dermatologist to evaluate the rash. It is possibly related to a prior fungal infection.          Other Visit Diagnoses       Need for vaccination        Relevant Orders    Tdap Vaccine Greater Than or Equal To 8yo IM (Completed)

## 2023-07-29 LAB
AMORPH URATE CRY URNS QL MICRO: NORMAL /HPF
BACTERIA UR QL AUTO: NORMAL /HPF
BASOPHILS # BLD AUTO: 0.06 10*3/MM3 (ref 0–0.2)
BASOPHILS NFR BLD AUTO: 1 % (ref 0–1.5)
DEPRECATED RDW RBC AUTO: 39.2 FL (ref 37–54)
EOSINOPHIL # BLD AUTO: 0.27 10*3/MM3 (ref 0–0.4)
EOSINOPHIL NFR BLD AUTO: 4.5 % (ref 0.3–6.2)
ERYTHROCYTE [DISTWIDTH] IN BLOOD BY AUTOMATED COUNT: 13 % (ref 12.3–15.4)
HCT VFR BLD AUTO: 46.1 % (ref 37.5–51)
HGB BLD-MCNC: 15.1 G/DL (ref 13–17.7)
HYALINE CASTS UR QL AUTO: NORMAL /LPF
IMM GRANULOCYTES # BLD AUTO: 0.01 10*3/MM3 (ref 0–0.05)
IMM GRANULOCYTES NFR BLD AUTO: 0.2 % (ref 0–0.5)
LYMPHOCYTES # BLD AUTO: 1.21 10*3/MM3 (ref 0.7–3.1)
LYMPHOCYTES NFR BLD AUTO: 20.1 % (ref 19.6–45.3)
MCH RBC QN AUTO: 27.8 PG (ref 26.6–33)
MCHC RBC AUTO-ENTMCNC: 32.8 G/DL (ref 31.5–35.7)
MCV RBC AUTO: 84.9 FL (ref 79–97)
MONOCYTES # BLD AUTO: 0.45 10*3/MM3 (ref 0.1–0.9)
MONOCYTES NFR BLD AUTO: 7.5 % (ref 5–12)
MUCOUS THREADS URNS QL MICRO: NORMAL /HPF
NEUTROPHILS NFR BLD AUTO: 4.01 10*3/MM3 (ref 1.7–7)
NEUTROPHILS NFR BLD AUTO: 66.7 % (ref 42.7–76)
NRBC BLD AUTO-RTO: 0 /100 WBC (ref 0–0.2)
PLATELET # BLD AUTO: 251 10*3/MM3 (ref 140–450)
PMV BLD AUTO: 12.3 FL (ref 6–12)
RBC # BLD AUTO: 5.43 10*6/MM3 (ref 4.14–5.8)
RBC # UR STRIP: NORMAL /HPF
REF LAB TEST METHOD: NORMAL
SQUAMOUS #/AREA URNS HPF: NORMAL /HPF
WBC # UR STRIP: NORMAL /HPF
WBC NRBC COR # BLD: 6.01 10*3/MM3 (ref 3.4–10.8)

## 2023-08-28 RX ORDER — AMLODIPINE BESYLATE 2.5 MG/1
2.5 TABLET ORAL DAILY
Qty: 90 TABLET | Refills: 1 | Status: SHIPPED | OUTPATIENT
Start: 2023-08-28

## 2023-08-29 RX ORDER — BUDESONIDE AND FORMOTEROL FUMARATE DIHYDRATE 160; 4.5 UG/1; UG/1
2 AEROSOL RESPIRATORY (INHALATION) 2 TIMES DAILY
Qty: 10.2 G | Refills: 0 | Status: SHIPPED | OUTPATIENT
Start: 2023-08-29

## 2023-08-29 NOTE — TELEPHONE ENCOUNTER
Rx Refill Note  Requested Prescriptions     Pending Prescriptions Disp Refills    Symbicort 160-4.5 MCG/ACT inhaler       Si puffs 2 (Two) Times a Day.      Last office visit with prescribing clinician: 2023   Last telemedicine visit with prescribing clinician: Visit date not found   Next office visit with prescribing clinician: 2024                         Would you like a call back once the refill request has been completed: [] Yes [] No    If the office needs to give you a call back, can they leave a voicemail: [] Yes [] No    Felicita Wei LPN  23, 08:01 EDT

## 2023-10-16 ENCOUNTER — TELEPHONE (OUTPATIENT)
Dept: INTERNAL MEDICINE | Facility: CLINIC | Age: 53
End: 2023-10-16
Payer: COMMERCIAL

## 2023-10-16 NOTE — TELEPHONE ENCOUNTER
He stated he tried to call to schedule an appt for his wife. She has an appt scheduled now he said and he did not have any questions

## 2023-11-01 RX ORDER — BUDESONIDE AND FORMOTEROL FUMARATE DIHYDRATE 160; 4.5 UG/1; UG/1
2 AEROSOL RESPIRATORY (INHALATION) 2 TIMES DAILY
Qty: 10.2 G | Refills: 0 | Status: SHIPPED | OUTPATIENT
Start: 2023-11-01

## 2023-11-01 NOTE — TELEPHONE ENCOUNTER
"    Caller: Perfecto Garcia \"Jose\"    Relationship: Self    Best call back number: [phone]    Requested Prescriptions:   Requested Prescriptions     Pending Prescriptions Disp Refills    Symbicort 160-4.5 MCG/ACT inhaler 10.2 g 0     Sig: Inhale 2 puffs 2 (Two) Times a Day.        Pharmacy where request should be sent:    "[id number] - [address] & [facility] - [phone]  - [phone]     Last office visit with prescribing clinician: 7/28/2023   Last telemedicine visit with prescribing clinician: Visit date not found   Next office visit with prescribing clinician: 3/12/2024     Additional details provided by patient:     Does the patient have less than a 3 day supply:  [x] Yes  [] No    Would you like a call back once the refill request has been completed: [x] Yes [] No    If the office needs to give you a call back, can they leave a voicemail: [x] Yes [] No    Tarun Santiago Rep   11/01/23 13:01 EDT         "

## 2023-11-07 ENCOUNTER — OFFICE VISIT (OUTPATIENT)
Dept: INTERNAL MEDICINE | Facility: CLINIC | Age: 53
End: 2023-11-07
Payer: COMMERCIAL

## 2023-11-07 VITALS
BODY MASS INDEX: 23.03 KG/M2 | HEART RATE: 76 BPM | WEIGHT: 170 LBS | TEMPERATURE: 98.2 F | DIASTOLIC BLOOD PRESSURE: 84 MMHG | SYSTOLIC BLOOD PRESSURE: 142 MMHG | HEIGHT: 72 IN

## 2023-11-07 DIAGNOSIS — R05.1 ACUTE COUGH: ICD-10-CM

## 2023-11-07 DIAGNOSIS — J20.8 ACUTE BRONCHITIS DUE TO OTHER SPECIFIED ORGANISMS: Primary | ICD-10-CM

## 2023-11-07 LAB
EXPIRATION DATE: ABNORMAL
FLUAV AG UPPER RESP QL IA.RAPID: NOT DETECTED
FLUBV AG UPPER RESP QL IA.RAPID: NOT DETECTED
INTERNAL CONTROL: ABNORMAL
Lab: ABNORMAL
SARS-COV-2 AG UPPER RESP QL IA.RAPID: NOT DETECTED

## 2023-11-07 PROCEDURE — 87428 SARSCOV & INF VIR A&B AG IA: CPT | Performed by: INTERNAL MEDICINE

## 2023-11-07 PROCEDURE — 99213 OFFICE O/P EST LOW 20 MIN: CPT | Performed by: INTERNAL MEDICINE

## 2023-11-07 RX ORDER — FLUTICASONE PROPIONATE 50 MCG
1 SPRAY, SUSPENSION (ML) NASAL DAILY
COMMUNITY

## 2023-11-07 RX ORDER — DOXYCYCLINE HYCLATE 100 MG/1
100 CAPSULE ORAL 2 TIMES DAILY
Qty: 14 CAPSULE | Refills: 0 | Status: SHIPPED | OUTPATIENT
Start: 2023-11-07
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
## 2023-12-08 RX ORDER — ALBUTEROL SULFATE 90 UG/1
2 AEROSOL, METERED RESPIRATORY (INHALATION) EVERY 4 HOURS PRN
Qty: 18 G | Refills: 5 | Status: SHIPPED | OUTPATIENT
Start: 2023-12-08

## 2024-01-16 ENCOUNTER — PATIENT MESSAGE (OUTPATIENT)
Dept: INTERNAL MEDICINE | Facility: CLINIC | Age: 54
End: 2024-01-16
Payer: COMMERCIAL

## 2024-01-16 RX ORDER — BUDESONIDE, GLYCOPYRROLATE, AND FORMOTEROL FUMARATE 160; 9; 4.8 UG/1; UG/1; UG/1
2 AEROSOL, METERED RESPIRATORY (INHALATION) 2 TIMES DAILY
Qty: 12 G | Refills: 0 | COMMUNITY
Start: 2024-01-16

## 2024-01-16 NOTE — TELEPHONE ENCOUNTER
From: Perfecto Garcia  To: Chel Blakely  Sent: 1/16/2024 9:13 AM EST  Subject: Daily inhaler    I need to refill my daily inhaler. I’m out of my FSA money. Is there something I can take that is less expensive? Try a different kind?

## 2024-01-16 NOTE — TELEPHONE ENCOUNTER
Message noted about Symbicort and I provided a sample of Breztri which is the closest alternative to be taken 2 puffs twice daily and the patient can contact Dr. Blakely when she returns for possible renewal change please inform

## 2024-01-22 ENCOUNTER — OFFICE VISIT (OUTPATIENT)
Dept: INTERNAL MEDICINE | Facility: CLINIC | Age: 54
End: 2024-01-22
Payer: COMMERCIAL

## 2024-01-22 VITALS
BODY MASS INDEX: 23.57 KG/M2 | DIASTOLIC BLOOD PRESSURE: 90 MMHG | TEMPERATURE: 98.4 F | SYSTOLIC BLOOD PRESSURE: 136 MMHG | HEART RATE: 76 BPM | WEIGHT: 174 LBS | HEIGHT: 72 IN

## 2024-01-22 DIAGNOSIS — J30.2 PERENNIAL ALLERGIC RHINITIS WITH SEASONAL VARIATION: ICD-10-CM

## 2024-01-22 DIAGNOSIS — J01.00 ACUTE NON-RECURRENT MAXILLARY SINUSITIS: Primary | ICD-10-CM

## 2024-01-22 DIAGNOSIS — J30.89 PERENNIAL ALLERGIC RHINITIS WITH SEASONAL VARIATION: ICD-10-CM

## 2024-01-22 DIAGNOSIS — R05.1 ACUTE COUGH: ICD-10-CM

## 2024-01-22 PROCEDURE — 99213 OFFICE O/P EST LOW 20 MIN: CPT

## 2024-01-22 RX ORDER — BENZONATATE 100 MG/1
100 CAPSULE ORAL 3 TIMES DAILY PRN
Qty: 30 CAPSULE | Refills: 0 | Status: SHIPPED | OUTPATIENT
Start: 2024-01-22

## 2024-01-22 RX ORDER — DOXYCYCLINE HYCLATE 100 MG/1
100 CAPSULE ORAL 2 TIMES DAILY
Qty: 14 CAPSULE | Refills: 0 | Status: SHIPPED | OUTPATIENT
Start: 2024-01-22 | End: 2024-01-29

## 2024-01-22 RX ORDER — KETOCONAZOLE 20 MG/G
1 CREAM TOPICAL DAILY
COMMUNITY
Start: 2023-12-20

## 2024-01-22 RX ORDER — FLUTICASONE PROPIONATE 50 MCG
1 SPRAY, SUSPENSION (ML) NASAL DAILY
Qty: 18.2 ML | Refills: 1 | Status: SHIPPED | OUTPATIENT
Start: 2024-01-22
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
## 2024-01-31 DIAGNOSIS — S22.31XD CLOSED FRACTURE OF ONE RIB OF RIGHT SIDE WITH ROUTINE HEALING, SUBSEQUENT ENCOUNTER: ICD-10-CM

## 2024-01-31 DIAGNOSIS — S22.31XD CLOSED FRACTURE OF ONE RIB OF RIGHT SIDE WITH ROUTINE HEALING, SUBSEQUENT ENCOUNTER: Primary | ICD-10-CM

## 2024-01-31 RX ORDER — TRAMADOL HYDROCHLORIDE 50 MG/1
50 TABLET ORAL EVERY 6 HOURS PRN
Qty: 30 TABLET | Refills: 0 | Status: SHIPPED | OUTPATIENT
Start: 2024-01-31 | End: 2024-01-31 | Stop reason: SDUPTHER

## 2024-01-31 RX ORDER — TRAMADOL HYDROCHLORIDE 50 MG/1
50 TABLET ORAL EVERY 6 HOURS PRN
Qty: 30 TABLET | Refills: 0 | Status: SHIPPED | OUTPATIENT
Start: 2024-01-31

## 2024-01-31 RX ORDER — MONTELUKAST SODIUM 10 MG/1
10 TABLET ORAL NIGHTLY
Qty: 90 TABLET | Refills: 1 | Status: SHIPPED | OUTPATIENT
Start: 2024-01-31

## 2024-01-31 RX ORDER — MONTELUKAST SODIUM 10 MG/1
10 TABLET ORAL NIGHTLY
Qty: 30 TABLET | Refills: 1 | Status: SHIPPED | OUTPATIENT
Start: 2024-01-31 | End: 2024-01-31

## 2024-01-31 RX ORDER — FAMOTIDINE 40 MG/1
40 TABLET, FILM COATED ORAL 2 TIMES DAILY
Qty: 180 TABLET | Refills: 1 | Status: SHIPPED | OUTPATIENT
Start: 2024-01-31

## 2024-01-31 RX ORDER — TRAMADOL HYDROCHLORIDE 50 MG/1
50 TABLET ORAL EVERY 6 HOURS PRN
Qty: 30 TABLET | Refills: 0 | OUTPATIENT
Start: 2024-01-31

## 2024-01-31 NOTE — TELEPHONE ENCOUNTER
"Caller: Perfecto Garcia \"Jose\"    Relationship: Self    Best call back number: [phone]     Requested Prescriptions:   Requested Prescriptions     Pending Prescriptions Disp Refills    traMADol (ULTRAM) 50 MG tablet 30 tablet 0     Sig: Take 1 tablet by mouth Every 6 (Six) Hours As Needed for Moderate Pain.      Pharm[facility] where request should be sent: [id number] - [address] AT [facility] & [facility] - [phone]  - [phone] FX     Last office visit with prescribing clinician: 7/28/2023   Last telemedicine visit with prescribing clinician: Visit date not found   Next office visit with prescribing clinician: 3/12/2024     Additional details provided by patient: THIS PRESCRIPTION WAS SENT TO A PHARMACY THAT IS NOT EASY FOR THEM TO GET TO. [facility]?    Does the patient have less than a 3 day supply:  [x] Yes  [] No    Would you like a call back once the refill request has been completed: [x] Yes [] No    If the office needs to give you a call back, can they leave a voicemail: [x] Yes [] No    Tarun Brown Rep   01/31/24 16:24 EST   "

## 2024-01-31 NOTE — TELEPHONE ENCOUNTER
Additional details provided by patient: THIS PRESCRIPTION WAS SENT TO A PHARMACY THAT IS NOT EASY FOR THEM TO GET TO. CAN IT BE SENT TO THE [facility] INSTEAD?

## 2024-02-21 RX ORDER — BUDESONIDE AND FORMOTEROL FUMARATE DIHYDRATE 160; 4.5 UG/1; UG/1
2 AEROSOL RESPIRATORY (INHALATION)
Qty: 10.2 G | Refills: 12 | Status: SHIPPED | OUTPATIENT
Start: 2024-02-21

## 2024-02-28 RX ORDER — HYDROCORTISONE 10 MG/ML
LOTION TOPICAL 2 TIMES DAILY PRN
Qty: 118 ML | Refills: 5 | Status: SHIPPED | OUTPATIENT
Start: 2024-02-28 | End: 2024-02-29 | Stop reason: SDUPTHER

## 2024-02-29 ENCOUNTER — OFFICE VISIT (OUTPATIENT)
Dept: INTERNAL MEDICINE | Facility: CLINIC | Age: 54
End: 2024-02-29
Payer: COMMERCIAL

## 2024-02-29 VITALS
DIASTOLIC BLOOD PRESSURE: 88 MMHG | HEART RATE: 70 BPM | WEIGHT: 176 LBS | TEMPERATURE: 98 F | HEIGHT: 72 IN | SYSTOLIC BLOOD PRESSURE: 146 MMHG | BODY MASS INDEX: 23.84 KG/M2 | OXYGEN SATURATION: 95 %

## 2024-02-29 DIAGNOSIS — J45.21 MILD INTERMITTENT ASTHMA WITH ACUTE EXACERBATION: Primary | ICD-10-CM

## 2024-02-29 DIAGNOSIS — R05.2 SUBACUTE COUGH: ICD-10-CM

## 2024-02-29 DIAGNOSIS — S22.31XD CLOSED FRACTURE OF ONE RIB OF RIGHT SIDE WITH ROUTINE HEALING: Chronic | ICD-10-CM

## 2024-02-29 DIAGNOSIS — J30.89 PERENNIAL ALLERGIC RHINITIS WITH SEASONAL VARIATION: ICD-10-CM

## 2024-02-29 DIAGNOSIS — J30.2 PERENNIAL ALLERGIC RHINITIS WITH SEASONAL VARIATION: ICD-10-CM

## 2024-02-29 DIAGNOSIS — J84.9 INTERSTITIAL LUNG DISEASE: Chronic | ICD-10-CM

## 2024-02-29 RX ORDER — HYDROCORTISONE 10 MG/ML
LOTION TOPICAL 2 TIMES DAILY PRN
Qty: 118 ML | Refills: 5 | Status: SHIPPED | OUTPATIENT
Start: 2024-02-29

## 2024-02-29 RX ORDER — FLUTICASONE PROPIONATE 50 MCG
1 SPRAY, SUSPENSION (ML) NASAL 2 TIMES DAILY
Qty: 18.2 ML | Refills: 11 | Status: SHIPPED | OUTPATIENT
Start: 2024-02-29

## 2024-02-29 RX ORDER — DOXYCYCLINE HYCLATE 100 MG/1
100 CAPSULE ORAL 2 TIMES DAILY
Qty: 20 CAPSULE | Refills: 0 | Status: SHIPPED | OUTPATIENT
Start: 2024-02-29 | End: 2024-03-10

## 2024-02-29 NOTE — ASSESSMENT & PLAN NOTE
He will continue Flonase nasal spray twice a day. Will look further into why Dr. Kumari suggested that he may want to stop the Singular. He also sees Dr. Mackenzie Rousseau, allergist at .

## 2024-02-29 NOTE — ASSESSMENT & PLAN NOTE
Will treat with doxycycline twice a day for 10 days. He will continue using the Symbicort inhaler 2 puffs twice a day. He will increase use of the albuterol rescue inhaler to help with congestion, wheezing, and bronchospasm. If not improving in 4 to 5 days on this treatment, we will add steroids with oral prednisone.

## 2024-02-29 NOTE — ASSESSMENT & PLAN NOTE
He has had a full work-up by Dr. Kmuari, at . 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 He recommended a medication to the patient over the phone, but it is unknown what that medication was. He did receive a communication from his office this morning and I read it on the patient's phone, but it does not say what the medication is that he is recommending. The patient does not have another appointment with Dr. Kumari until 06/2024. I highly recommend that he call their office and see him sooner to discuss what his recommendations are.
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
## 2024-03-05 NOTE — PATIENT INSTRUCTIONS
Patient Instructions  Problem List Items Addressed This Visit          Allergies and Adverse Reactions    Perennial allergic rhinitis with seasonal variation    Overview     Sees Dr. Mackenzie Rousseau, allergist at .          Current Assessment & Plan     He will continue Flonase nasal spray twice a day. Will look further into why Dr. Kumari suggested that he may want to stop the Singular. He also sees Dr. Mackenzie Rousseau, allergist at .           Relevant Medications    fluticasone (FLONASE) 50 MCG/ACT nasal spray       Musculoskeletal and Injuries    Closed fracture of one rib of right side with routine healing (Chronic)    Overview     R 8th rib.          Current Assessment & Plan     He has a new rib fracture of the right 8th rib. It is most likely pathological since the patient does not recall any energy injury. He had a bone scan which did light up at the 8th rib and also showed some uptake in the right clavicle area. He did see a surgeon at , Dr. Aydin Rousseau, for evaluation. The surgeon did not feel there was anything that he could biopsy. He states in his note that there was no evidence of lytic or blastic lesion and no surrounding mass on the CT.            Pulmonary and Pneumonias    Interstitial lung disease (Chronic)    Current Assessment & Plan     He has had a full work-up by Dr. Kumari, at . 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 He recommended a medication to the patient over the phone, but it is unknown what that medication was. He did receive a communication from his office this morning and I read it on the patient's phone, but it does not say what the medication is that he is recommending. The patient does not have another appointment with Dr. Kumari until 06/2024. I highly recommend that he call their office and see him sooner to discuss what his recommendations are.         Relevant  Medications    albuterol sulfate HFA (ProAir HFA) 108 (90 Base) MCG/ACT inhaler    montelukast (SINGULAIR) 10 MG tablet    budesonide-formoterol (Symbicort) 160-4.5 MCG/ACT inhaler    fluticasone (FLONASE) 50 MCG/ACT nasal spray    Mild intermittent asthma - Primary    Overview     Follow-up with Dr. Mackenzie Rousseau.    He will continue trying to use the Symbicort inhaler 2 times a day. He will continue taking montelukast daily. He will continue using the albuterol as needed.           Current Assessment & Plan     Will treat with doxycycline twice a day for 10 days. He will continue using the Symbicort inhaler 2 puffs twice a day. He will increase use of the albuterol rescue inhaler to help with congestion, wheezing, and bronchospasm. If not improving in 4 to 5 days on this treatment, we will add steroids with oral prednisone.               Relevant Medications    albuterol sulfate HFA (ProAir HFA) 108 (90 Base) MCG/ACT inhaler    montelukast (SINGULAIR) 10 MG tablet    budesonide-formoterol (Symbicort) 160-4.5 MCG/ACT inhaler    Subacute cough    Current Assessment & Plan     Will treat with doxycycline twice a day for 10 days. He will continue using the Symbicort inhaler 2 puffs twice a day. He will increase use of the albuterol rescue inhaler to help with congestion, wheezing, and bronchospasm. If not improving in 4 to 5 days on this treatment, we will add steroids with oral prednisone.           Relevant Orders    POCT SARS-CoV-2 + Flu Antigen IKE (Completed)

## 2024-03-12 ENCOUNTER — OFFICE VISIT (OUTPATIENT)
Dept: INTERNAL MEDICINE | Facility: CLINIC | Age: 54
End: 2024-03-12
Payer: COMMERCIAL

## 2024-03-12 ENCOUNTER — LAB (OUTPATIENT)
Dept: LAB | Facility: HOSPITAL | Age: 54
End: 2024-03-12
Payer: COMMERCIAL

## 2024-03-12 VITALS
DIASTOLIC BLOOD PRESSURE: 76 MMHG | HEART RATE: 63 BPM | WEIGHT: 179 LBS | HEIGHT: 72 IN | OXYGEN SATURATION: 98 % | BODY MASS INDEX: 24.24 KG/M2 | TEMPERATURE: 98 F | SYSTOLIC BLOOD PRESSURE: 140 MMHG

## 2024-03-12 DIAGNOSIS — J84.9 INTERSTITIAL LUNG DISEASE: Chronic | ICD-10-CM

## 2024-03-12 DIAGNOSIS — Z00.00 ANNUAL PHYSICAL EXAM: Primary | ICD-10-CM

## 2024-03-12 DIAGNOSIS — G43.109 MIGRAINE WITH AURA AND WITHOUT STATUS MIGRAINOSUS, NOT INTRACTABLE: ICD-10-CM

## 2024-03-12 DIAGNOSIS — R73.09 ABNORMAL GLUCOSE: ICD-10-CM

## 2024-03-12 DIAGNOSIS — M19.041 PRIMARY OSTEOARTHRITIS OF RIGHT HAND: Chronic | ICD-10-CM

## 2024-03-12 DIAGNOSIS — E55.9 VITAMIN D DEFICIENCY: ICD-10-CM

## 2024-03-12 DIAGNOSIS — I10 BENIGN ESSENTIAL HYPERTENSION: ICD-10-CM

## 2024-03-12 DIAGNOSIS — F43.21 SITUATIONAL DEPRESSION: Chronic | ICD-10-CM

## 2024-03-12 DIAGNOSIS — M25.511 CHRONIC RIGHT SHOULDER PAIN: Chronic | ICD-10-CM

## 2024-03-12 DIAGNOSIS — E78.00 HYPERCHOLESTEROLEMIA: ICD-10-CM

## 2024-03-12 DIAGNOSIS — G89.29 CHRONIC RIGHT SHOULDER PAIN: Chronic | ICD-10-CM

## 2024-03-12 LAB
25(OH)D3 SERPL-MCNC: 29.3 NG/ML (ref 30–100)
BACTERIA UR QL AUTO: ABNORMAL /HPF
BILIRUB UR QL STRIP: NEGATIVE
CHOLEST SERPL-MCNC: 232 MG/DL (ref 0–200)
CLARITY UR: CLEAR
COLOR UR: YELLOW
GLUCOSE UR STRIP-MCNC: NEGATIVE MG/DL
HBA1C MFR BLD: 5.5 % (ref 4.8–5.6)
HCYS SERPL-MCNC: 10.3 UMOL/L (ref 0–15)
HDLC SERPL-MCNC: 68 MG/DL (ref 40–60)
HGB UR QL STRIP.AUTO: NEGATIVE
HYALINE CASTS UR QL AUTO: ABNORMAL /LPF
KETONES UR QL STRIP: NEGATIVE
LDLC SERPL CALC-MCNC: 140 MG/DL (ref 0–100)
LDLC/HDLC SERPL: 2.01 {RATIO}
LEUKOCYTE ESTERASE UR QL STRIP.AUTO: NEGATIVE
MUCOUS THREADS URNS QL MICRO: ABNORMAL /HPF
NITRITE UR QL STRIP: NEGATIVE
PH UR STRIP.AUTO: 6 [PH] (ref 5–8)
PROT UR QL STRIP: NEGATIVE
RBC # UR STRIP: ABNORMAL /HPF
REF LAB TEST METHOD: ABNORMAL
SP GR UR STRIP: 1.02 (ref 1–1.03)
SQUAMOUS #/AREA URNS HPF: ABNORMAL /HPF
TRIGL SERPL-MCNC: 135 MG/DL (ref 0–150)
UROBILINOGEN UR QL STRIP: NORMAL
VLDLC SERPL-MCNC: 24 MG/DL (ref 5–40)
WBC # UR STRIP: ABNORMAL /HPF

## 2024-03-12 PROCEDURE — 83036 HEMOGLOBIN GLYCOSYLATED A1C: CPT

## 2024-03-12 PROCEDURE — 82306 VITAMIN D 25 HYDROXY: CPT

## 2024-03-12 PROCEDURE — 81001 URINALYSIS AUTO W/SCOPE: CPT

## 2024-03-12 PROCEDURE — 80061 LIPID PANEL: CPT

## 2024-03-12 PROCEDURE — 83090 ASSAY OF HOMOCYSTEINE: CPT

## 2024-03-12 RX ORDER — DESVENLAFAXINE 25 MG/1
25 TABLET, EXTENDED RELEASE ORAL DAILY
Qty: 30 TABLET | Refills: 5 | Status: SHIPPED | OUTPATIENT
Start: 2024-03-12
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
## 2024-03-13 NOTE — ASSESSMENT & PLAN NOTE
Continue amlodipine and olmesartan. Continue to avoid salt in the diet.  Check blood pressures at home and call us if running more than 120/70.

## 2024-03-13 NOTE — PATIENT INSTRUCTIONS
Patient Instructions  Problem List Items Addressed This Visit          Cardiac and Vasculature    Hypercholesterolemia    Overview     He will continue to decrease fats and sugar in the diet and will continue to increase physical activity and exercise.           Relevant Orders    Lipid Panel (Completed)    Urinalysis With Microscopic - Urine, Clean Catch (Completed)    Homocysteine (Completed)    Benign essential hypertension    Overview     Takes amlodipine 2.5mg daily and olmesartan 40mg daily.            Current Assessment & Plan     Continue amlodipine and olmesartan. Continue to avoid salt in the diet.  Check blood pressures at home and call us if running more than 120/70.         Relevant Medications    olmesartan (BENICAR) 40 MG tablet    amLODIPine (NORVASC) 2.5 MG tablet    Other Relevant Orders    ECG 12 Lead       Endocrine and Metabolic    Abnormal glucose    Relevant Orders    Hemoglobin A1c (Completed)       Mental Health    Situational depression (Chronic)    Overview     Patient started desvenlafaxine tablet 8/2022.    Since he had some mild sexual side effects, we decreased the desvenlafaxine to 25 mg daily.          Relevant Medications    Desvenlafaxine Succinate ER 25 MG tablet sustained-release 24 hour       Musculoskeletal and Injuries    Primary osteoarthritis of right hand (Chronic)    Overview     R 1st PIP.         Chronic right shoulder pain (Chronic)       Neuro    Migraine    Overview     Aura with flashing lights.  Relieved with advil pretty well.         Relevant Medications    amLODIPine (NORVASC) 2.5 MG tablet    traMADol (ULTRAM) 50 MG tablet    Desvenlafaxine Succinate ER 25 MG tablet sustained-release 24 hour       Pulmonary and Pneumonias    Interstitial lung disease (Chronic)    Overview     Seeing Dr. Kumari who presented case in multidisciplinary ILD conference with pulmonology, rheumatology, pathology and radiology. 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  In context of pathological rib fracture, he recommended treatment with nucala(mepolizumab).  He will monitor chest CT scans. He recommended stopping Singulair since there can be association of it with EGPA.  Nucala will also help control his asthma and allergic rhinitis.          Relevant Medications    albuterol sulfate HFA (ProAir HFA) 108 (90 Base) MCG/ACT inhaler    budesonide-formoterol (Symbicort) 160-4.5 MCG/ACT inhaler    fluticasone (FLONASE) 50 MCG/ACT nasal spray    Mepolizumab 100 MG/ML solution auto-injector     Other Visit Diagnoses       Annual physical exam    -  Primary    Vitamin D deficiency        Relevant Orders    Vitamin D,25-Hydroxy (Completed)            Exercising to Stay Healthy  To become healthy and stay healthy, it is recommended that you do moderate-intensity and vigorous-intensity exercise. You can tell that you are exercising at a moderate intensity if your heart starts beating faster and you start breathing faster but can still hold a conversation. You can tell that you are exercising at a vigorous intensity if you are breathing much harder and faster and cannot hold a conversation while exercising.  How can exercise benefit me?  Exercising regularly is important. It has many health benefits, such as:  Improving overall fitness, flexibility, and endurance.  Increasing bone density.  Helping with weight control.  Decreasing body fat.  Increasing muscle strength and endurance.  Reducing stress and tension, anxiety, depression, or anger.  Improving overall health.  What guidelines should I follow while exercising?  Before you start a new exercise program, talk with your health care provider.  Do not exercise so much that you hurt yourself, feel dizzy, or get very short of breath.  Wear comfortable clothes and wear shoes with good support.  Drink plenty of water while you exercise to prevent dehydration or heat  stroke.  Work out until your breathing and your heartbeat get faster (moderate intensity).  How often should I exercise?  Choose an activity that you enjoy, and set realistic goals. Your health care provider can help you make an activity plan that is individually designed and works best for you.  Exercise regularly as told by your health care provider. This may include:  Doing strength training two times a week, such as:  Lifting weights.  Using resistance bands.  Push-ups.  Sit-ups.  Yoga.  Doing a certain intensity of exercise for a given amount of time. Choose from these options:  A total of 150 minutes of moderate-intensity exercise every week.  A total of 75 minutes of vigorous-intensity exercise every week.  A mix of moderate-intensity and vigorous-intensity exercise every week.  Children, pregnant women, people who have not exercised regularly, people who are overweight, and older adults may need to talk with a health care provider about what activities are safe to perform. If you have a medical condition, be sure to talk with your health care provider before you start a new exercise program.  What are some exercise ideas?  Moderate-intensity exercise ideas include:  Walking 1 mile (1.6 km) in about 15 minutes.  Biking.  Hiking.  Golfing.  Dancing.  Water aerobics.  Vigorous-intensity exercise ideas include:  Walking 4.5 miles (7.2 km) or more in about 1 hour.  Jogging or running 5 miles (8 km) in about 1 hour.  Biking 10 miles (16.1 km) or more in about 1 hour.  Lap swimming.  Roller-skating or in-line skating.  Cross-country skiing.  Vigorous competitive sports, such as football, basketball, and soccer.  Jumping rope.  Aerobic dancing.  What are some everyday activities that can help me get exercise?  Yard work, such as:  Pushing a .  Raking and bagging leaves.  Washing your car.  Pushing a stroller.  Shoveling snow.  Gardening.  Washing windows or floors.  How can I be more active in my  day-to-day activities?  Use stairs instead of an elevator.  Take a walk during your lunch break.  If you drive, park your car farther away from your work or school.  If you take public transportation, get off one stop early and walk the rest of the way.  Stand up or walk around during all of your indoor phone calls.  Get up, stretch, and walk around every 30 minutes throughout the day.  Enjoy exercise with a friend. Support to continue exercising will help you keep a regular routine of activity.  Where to find more information  You can find more information about exercising to stay healthy from:  [place] Department of Health and Human Services: www.hhs.gov  Centers for Disease Control and Prevention (CDC): www.cdc.gov  Summary  Exercising regularly is important. It will improve your overall fitness, flexibility, and endurance.  Regular exercise will also improve your overall health. It can help you control your weight, reduce stress, and improve your bone density.  Do not exercise so much that you hurt yourself, feel dizzy, or get very short of breath.  Before you start a new exercise program, talk with your health care provider.  This information is not intended to replace advice given to you by your health care provider. Make sure you discuss any questions you have with your health care provider.  Document Revised: 04/15/2022 Document Reviewed: 04/15/2022  [employer] Patient Education © 2023 [employer]

## 2024-03-14 ENCOUNTER — TELEPHONE (OUTPATIENT)
Dept: INTERNAL MEDICINE | Facility: CLINIC | Age: 54
End: 2024-03-14
Payer: COMMERCIAL

## 2024-03-14 NOTE — TELEPHONE ENCOUNTER
"Spoke with pharmacy. Confirmed pt is to be taking 25mg of desvenlafaxine ER based on the office note from 3/12/24:    \"Situational depression (Chronic)     Overview        Patient started desvenlafaxine tablet 8/2022.     Since he had some mild sexual side effects, we decreased the desvenlafaxine to 25 mg daily.          Relevant Medications    Desvenlafaxine Succinate ER 25 MG tablet sustained-release 24 hour    \"  "

## 2024-03-18 RX ORDER — ACETAMINOPHEN 160 MG
2000 TABLET,DISINTEGRATING ORAL DAILY
Start: 2024-03-18 | End: 2025-03-18

## 2024-03-18 RX ORDER — OSELTAMIVIR PHOSPHATE 75 MG/1
75 CAPSULE ORAL DAILY
Qty: 10 CAPSULE | Refills: 0 | Status: SHIPPED | OUTPATIENT
Start: 2024-03-18

## 2024-04-04 RX ORDER — BUDESONIDE AND FORMOTEROL FUMARATE DIHYDRATE 160; 4.5 UG/1; UG/1
2 AEROSOL RESPIRATORY (INHALATION)
Qty: 10.2 G | Refills: 12 | Status: SHIPPED | OUTPATIENT
Start: 2024-04-04

## 2024-04-04 RX ORDER — ACETAMINOPHEN 160 MG
2000 TABLET,DISINTEGRATING ORAL DAILY
Qty: 30 CAPSULE | Refills: 11
Start: 2024-04-04 | End: 2025-04-04

## 2024-04-05 RX ORDER — DESVENLAFAXINE 25 MG/1
25 TABLET, EXTENDED RELEASE ORAL DAILY
Qty: 30 TABLET | Refills: 5 | Status: SHIPPED | OUTPATIENT
Start: 2024-04-05

## 2024-05-07 RX ORDER — OLMESARTAN MEDOXOMIL 40 MG/1
40 TABLET ORAL DAILY
Qty: 90 TABLET | Refills: 1 | Status: SHIPPED | OUTPATIENT
Start: 2024-05-07

## 2024-06-05 RX ORDER — OLMESARTAN MEDOXOMIL 40 MG/1
40 TABLET ORAL DAILY
Qty: 90 TABLET | Refills: 1 | Status: SHIPPED | OUTPATIENT
Start: 2024-06-05

## 2024-06-05 NOTE — TELEPHONE ENCOUNTER
Rx Refill Note  Requested Prescriptions     Pending Prescriptions Disp Refills    olmesartan (BENICAR) 40 MG tablet 90 tablet 1     Sig: Take 1 tablet by mouth Daily.      Last office visit with prescribing clinician: 3/12/2024   Last telemedicine visit with prescribing clinician: Visit date not found   Next office visit with prescribing clinician: 6/12/2024                         Would you like a call back once the refill request has been completed: [] Yes [] No    If the office needs to give you a call back, can they leave a voicemail: [] Yes [] No    Peng Harvey MA  06/05/24, 15:25 EDT

## 2024-06-12 ENCOUNTER — OFFICE VISIT (OUTPATIENT)
Dept: INTERNAL MEDICINE | Facility: CLINIC | Age: 54
End: 2024-06-12
Payer: COMMERCIAL

## 2024-06-12 VITALS
OXYGEN SATURATION: 95 % | TEMPERATURE: 97.7 F | HEART RATE: 77 BPM | BODY MASS INDEX: 23.81 KG/M2 | SYSTOLIC BLOOD PRESSURE: 120 MMHG | WEIGHT: 175.8 LBS | HEIGHT: 72 IN | DIASTOLIC BLOOD PRESSURE: 78 MMHG

## 2024-06-12 DIAGNOSIS — J45.20 MILD INTERMITTENT ASTHMA WITHOUT COMPLICATION: ICD-10-CM

## 2024-06-12 DIAGNOSIS — I10 BENIGN ESSENTIAL HYPERTENSION: ICD-10-CM

## 2024-06-12 DIAGNOSIS — E78.00 HYPERCHOLESTEROLEMIA: ICD-10-CM

## 2024-06-12 DIAGNOSIS — S22.31XD CLOSED FRACTURE OF ONE RIB OF RIGHT SIDE WITH ROUTINE HEALING: Chronic | ICD-10-CM

## 2024-06-12 DIAGNOSIS — G89.29 CHRONIC RIGHT SHOULDER PAIN: Primary | Chronic | ICD-10-CM

## 2024-06-12 DIAGNOSIS — F43.21 SITUATIONAL DEPRESSION: Chronic | ICD-10-CM

## 2024-06-12 DIAGNOSIS — J30.2 PERENNIAL ALLERGIC RHINITIS WITH SEASONAL VARIATION: ICD-10-CM

## 2024-06-12 DIAGNOSIS — M25.511 CHRONIC RIGHT SHOULDER PAIN: Primary | Chronic | ICD-10-CM

## 2024-06-12 DIAGNOSIS — J30.89 PERENNIAL ALLERGIC RHINITIS WITH SEASONAL VARIATION: ICD-10-CM

## 2024-06-12 DIAGNOSIS — B40.9 BLASTOMYCOSIS: Chronic | ICD-10-CM

## 2024-06-12 PROCEDURE — 99214 OFFICE O/P EST MOD 30 MIN: CPT | Performed by: INTERNAL MEDICINE

## 2024-06-12 RX ORDER — CETIRIZINE HYDROCHLORIDE 10 MG/1
10 TABLET ORAL DAILY
Qty: 90 TABLET | Refills: 1 | Status: SHIPPED | OUTPATIENT
Start: 2024-06-12

## 2024-06-12 RX ORDER — FLUTICASONE PROPIONATE 50 MCG
1 SPRAY, SUSPENSION (ML) NASAL 2 TIMES DAILY
Qty: 18.2 ML | Refills: 11 | Status: SHIPPED | OUTPATIENT
Start: 2024-06-12

## 2024-06-12 RX ORDER — ACETAMINOPHEN 160 MG
2000 TABLET,DISINTEGRATING ORAL DAILY
Start: 2024-06-12 | End: 2025-06-12

## 2024-06-12 NOTE — PATIENT INSTRUCTIONS
Patient Instructions   Problem List Items Addressed This Visit          Allergies and Adverse Reactions    Perennial allergic rhinitis with seasonal variation    Overview     Sees Dr. Mackenzie Rousseau, allergist at .          Relevant Medications    fluticasone (FLONASE) 50 MCG/ACT nasal spray       Exercising to Stay Healthy  To become healthy and stay healthy, it is recommended that you do moderate-intensity and vigorous-intensity exercise. You can tell that you are exercising at a moderate intensity if your heart starts beating faster and you start breathing faster but can still hold a conversation. You can tell that you are exercising at a vigorous intensity if you are breathing much harder and faster and cannot hold a conversation while exercising.  How can exercise benefit me?  Exercising regularly is important. It has many health benefits, such as:  Improving overall fitness, flexibility, and endurance.  Increasing bone density.  Helping with weight control.  Decreasing body fat.  Increasing muscle strength and endurance.  Reducing stress and tension, anxiety, depression, or anger.  Improving overall health.  What guidelines should I follow while exercising?  Before you start a new exercise program, talk with your health care provider.  Do not exercise so much that you hurt yourself, feel dizzy, or get very short of breath.  Wear comfortable clothes and wear shoes with good support.  Drink plenty of water while you exercise to prevent dehydration or heat stroke.  Work out until your breathing and your heartbeat get faster (moderate intensity).  How often should I exercise?  Choose an activity that you enjoy, and set realistic goals. Your health care provider can help you make an activity plan that is individually designed and works best for you.  Exercise regularly as told by your health care provider. This may include:  Doing strength training two times a week, such as:  Lifting weights.  Using resistance  bands.  Push-ups.  Sit-ups.  Yoga.  Doing a certain intensity of exercise for a given amount of time. Choose from these options:  A total of 150 minutes of moderate-intensity exercise every week.  A total of 75 minutes of vigorous-intensity exercise every week.  A mix of moderate-intensity and vigorous-intensity exercise every week.  Children, pregnant women, people who have not exercised regularly, people who are overweight, and older adults may need to talk with a health care provider about what activities are safe to perform. If you have a medical condition, be sure to talk with your health care provider before you start a new exercise program.  What are some exercise ideas?  Moderate-intensity exercise ideas include:  Walking 1 mile (1.6 km) in about 15 minutes.  Biking.  Hiking.  Golfing.  Dancing.  Water aerobics.  Vigorous-intensity exercise ideas include:  Walking 4.5 miles (7.2 km) or more in about 1 hour.  Jogging or running 5 miles (8 km) in about 1 hour.  Biking 10 miles (16.1 km) or more in about 1 hour.  Lap swimming.  Roller-skating or in-line skating.  Cross-country skiing.  Vigorous competitive sports, such as football, basketball, and soccer.  Jumping rope.  Aerobic dancing.  What are some everyday activities that can help me get exercise?  Yard work, such as:  Pushing a .  Raking and bagging leaves.  Washing your car.  Pushing a stroller.  Shoveling snow.  Gardening.  Washing windows or floors.  How can I be more active in my day-to-day activities?  Use stairs instead of an elevator.  Take a walk during your lunch break.  If you drive, park your car farther away from your work or school.  If you take public transportation, get off one stop early and walk the rest of the way.  Stand up or walk around during all of your indoor phone calls.  Get up, stretch, and walk around every 30 minutes throughout the day.  Enjoy exercise with a friend. Support to continue exercising will help you keep  a regular routine of activity.  Where to find more information  You can find more information about exercising to stay healthy from:  [place] Department of Health and Human Services: www.hhs.gov  Centers for Disease Control and Prevention (CDC): www.cdc.gov  Summary  Exercising regularly is important. It will improve your overall fitness, flexibility, and endurance.  Regular exercise will also improve your overall health. It can help you control your weight, reduce stress, and improve your bone density.  Do not exercise so much that you hurt yourself, feel dizzy, or get very short of breath.  Before you start a new exercise program, talk with your health care provider.  This information is not intended to replace advice given to you by your health care provider. Make sure you discuss any questions you have with your health care provider.  Document Revised: 04/15/2022 Document Reviewed: 04/15/2022  [employer] Patient Education © 2023 [employer]
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
## 2024-09-30 ENCOUNTER — OFFICE VISIT (OUTPATIENT)
Dept: INTERNAL MEDICINE | Facility: CLINIC | Age: 54
End: 2024-09-30
Payer: COMMERCIAL

## 2024-09-30 VITALS
TEMPERATURE: 97.8 F | SYSTOLIC BLOOD PRESSURE: 150 MMHG | OXYGEN SATURATION: 95 % | HEIGHT: 72 IN | DIASTOLIC BLOOD PRESSURE: 110 MMHG | HEART RATE: 87 BPM | BODY MASS INDEX: 24.24 KG/M2 | WEIGHT: 179 LBS

## 2024-09-30 DIAGNOSIS — J98.8 VIRAL RESPIRATORY ILLNESS: Primary | ICD-10-CM

## 2024-09-30 DIAGNOSIS — I10 BENIGN ESSENTIAL HYPERTENSION: ICD-10-CM

## 2024-09-30 DIAGNOSIS — R05.1 ACUTE COUGH: ICD-10-CM

## 2024-09-30 DIAGNOSIS — B97.89 VIRAL RESPIRATORY ILLNESS: Primary | ICD-10-CM

## 2024-09-30 LAB
EXPIRATION DATE: NORMAL
EXPIRATION DATE: NORMAL
FLUAV AG NPH QL: NEGATIVE
FLUBV AG NPH QL: NEGATIVE
INTERNAL CONTROL: NORMAL
INTERNAL CONTROL: NORMAL
Lab: NORMAL
Lab: NORMAL
SARS-COV-2 AG UPPER RESP QL IA.RAPID: NOT DETECTED

## 2024-09-30 RX ORDER — LOSARTAN POTASSIUM 100 MG/1
1 TABLET ORAL DAILY
COMMUNITY

## 2024-09-30 RX ORDER — BENZONATATE 200 MG/1
200 CAPSULE ORAL 2 TIMES DAILY PRN
Qty: 30 CAPSULE | Refills: 1 | Status: SHIPPED | OUTPATIENT
Start: 2024-09-30

## 2024-09-30 RX ORDER — DEXTROMETHORPHAN HYDROBROMIDE AND PROMETHAZINE HYDROCHLORIDE 15; 6.25 MG/5ML; MG/5ML
5 SYRUP ORAL 4 TIMES DAILY PRN
Qty: 240 ML | Refills: 0 | Status: SHIPPED | OUTPATIENT
Start: 2024-09-30

## 2024-09-30 NOTE — LETTER
September 30, 2024     Patient: Perfecto Garcia   YOB: 1970   Date of Visit: 9/30/2024       To Whom It May Concern:    It is my medical opinion that Perfecto Garcia may return to work October 2, 2024         Sincerely,        MARISELA Lennon    CC: No Recipients
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
## 2024-09-30 NOTE — ASSESSMENT & PLAN NOTE
B/P is elevated in office today.   Instruct to take b/p daily and let clinic know results.  He reported he no longer takes amlodipine.
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
## 2024-09-30 NOTE — ASSESSMENT & PLAN NOTE
Flu and COVID test were negative today.  Instructed to treat symptoms with over-the-counter medication.  Can alternate between Tylenol and Motrin as needed for fever and bodyaches.  Ordered chest x-ray with history of pneumonia.  Ordered cough syrup and benzonatate as needed.  Will call if he has not had any improvement in symptoms in 4 to 5 days.

## 2024-10-04 ENCOUNTER — TELEPHONE (OUTPATIENT)
Dept: INTERNAL MEDICINE | Facility: CLINIC | Age: 54
End: 2024-10-04
Payer: COMMERCIAL

## 2024-10-04 RX ORDER — AZITHROMYCIN 250 MG/1
TABLET, FILM COATED ORAL
Qty: 6 TABLET | Refills: 0 | Status: SHIPPED | OUTPATIENT
Start: 2024-10-04

## 2024-10-04 NOTE — TELEPHONE ENCOUNTER
"Caller: Perfecto Garcia \"Jose\"    Relationship: Self    Best call back number: [phone]     What medication are you requesting: ANTIBIOTIC     What are your current symptoms: HEAD CONGESTION AND YELLOW DISCHARGE, COUGHING, DRAINAGE     How long have you been experiencing symptoms: A FEW DAYS     Have you had these symptoms before:    [x] Yes  [] No    Have you been treated for these symptoms before:   [x] Yes  [] No    If a prescription is needed, what is your preferred pharmacy and phone number: [facility][id number] - [address] [facility] - [phone]  - [phone] FX     Additional notes:      "

## 2024-11-20 RX ORDER — ALBUTEROL SULFATE 90 UG/1
2 INHALANT RESPIRATORY (INHALATION) EVERY 4 HOURS PRN
Qty: 18 G | Refills: 5 | Status: SHIPPED | OUTPATIENT
Start: 2024-11-20

## 2024-12-09 RX ORDER — DESVENLAFAXINE 25 MG/1
25 TABLET, EXTENDED RELEASE ORAL DAILY
Qty: 30 TABLET | Refills: 5 | Status: SHIPPED | OUTPATIENT
Start: 2024-12-09

## 2024-12-09 NOTE — TELEPHONE ENCOUNTER
"Caller: Perfecto Garcia \"Jose\"    Relationship: Self    Best call back number: [phone]     Requested Prescriptions:   Requested Prescriptions     Pending Prescriptions Disp Refills    Desvenlafaxine Succinate ER 25 MG tablet sustained-release 24 hour 30 tablet 5     Sig: Take 1 tablet by mouth Daily.    OLMESARTAN 40MG    Pharmacy where request should be sent: [facility] [address] - [phone]  - [phone] FX     Last office visit with prescribing clinician: 6/12/2024   Last telemedicine visit with prescribing clinician: Visit date not found   Next office visit with prescribing clinician: 3/27/2025     Additional details provided by patient: PATIENT IS OUT.     Does the patient have less than a 3 day supply:  [x] Yes  [] No    Would you like a call back once the refill request has been completed: [] Yes [x] No    If the office needs to give you a call back, can they leave a voicemail: [] Yes [x] No    Cadance Dunaway, RegSched Rep   12/09/24 13:38 EST         "

## 2024-12-16 ENCOUNTER — TELEPHONE (OUTPATIENT)
Dept: INTERNAL MEDICINE | Facility: CLINIC | Age: 54
End: 2024-12-16

## 2024-12-16 RX ORDER — OLMESARTAN MEDOXOMIL 40 MG/1
40 TABLET ORAL DAILY
Qty: 90 TABLET | Refills: 1 | Status: SHIPPED | OUTPATIENT
Start: 2024-12-16

## 2024-12-16 NOTE — TELEPHONE ENCOUNTER
"Caller: Perfecto Garcia \"Jose\"    Relationship: Self    Best call back number: [phone]     Requested Prescriptions:   OLMESARTAN 40MG        Pharmacy where request should be sent: [facility] #[id number] - [address] [place] [facility] [place] [facility] [address] - [phone] PH - [phone] FX     Last office visit with prescribing clinician: 6/12/2024   Last telemedicine visit with prescribing clinician: Visit date not found   Next office visit with prescribing clinician: 3/27/2025     Additional details provided by patient: PATIENT IS OUT.     Does the patient have less than a 3 day supply:  [x] Yes  [] No    Would you like a call back once the refill request has been completed: [] Yes [x] No    If the office needs to give you a call back, can they leave a voicemail: [] Yes [x] No    Cadance Dunaway, RegSched Rep   12/16/24 09:00 EST         "

## 2025-02-06 RX ORDER — DESVENLAFAXINE 25 MG/1
25 TABLET, EXTENDED RELEASE ORAL DAILY
Qty: 30 TABLET | Refills: 5 | Status: SHIPPED | OUTPATIENT
Start: 2025-02-06

## 2025-02-18 RX ORDER — OLMESARTAN MEDOXOMIL 40 MG/1
40 TABLET ORAL DAILY
Qty: 90 TABLET | Refills: 1 | Status: SHIPPED | OUTPATIENT
Start: 2025-02-18

## 2025-02-18 NOTE — TELEPHONE ENCOUNTER
Rx Refill Note  Requested Prescriptions     Pending Prescriptions Disp Refills    olmesartan (BENICAR) 40 MG tablet 90 tablet 1     Sig: Take 1 tablet by mouth Daily.      Last office visit with prescribing clinician: 6/12/2024   Last telemedicine visit with prescribing clinician: Visit date not found   Next office visit with prescribing clinician: 3/27/2025                         Would you like a call back once the refill request has been completed: [] Yes [] No    If the office needs to give you a call back, can they leave a voicemail: [] Yes [] No    Dori Murcia RN  02/18/25, 13:04 EST

## 2025-03-10 RX ORDER — DESVENLAFAXINE 25 MG/1
25 TABLET, EXTENDED RELEASE ORAL DAILY
Qty: 30 TABLET | Refills: 0 | Status: SHIPPED | OUTPATIENT
Start: 2025-03-10

## 2025-03-10 NOTE — TELEPHONE ENCOUNTER
Rx Refill Note  Requested Prescriptions     Pending Prescriptions Disp Refills    Desvenlafaxine Succinate ER 25 MG tablet sustained-release 24 hour 30 tablet 5     Sig: Take 1 tablet by mouth Daily.      Last office visit with prescribing clinician: 6/12/2024   Last telemedicine visit with prescribing clinician: Visit date not found   Next office visit with prescribing clinician: 3/27/2025                         Would you like a call back once the refill request has been completed: [] Yes [] No    If the office needs to give you a call back, can they leave a voicemail: [] Yes [] No    Felicita Wei LPN  03/10/25, 08:06 EDT

## 2025-03-27 ENCOUNTER — LAB (OUTPATIENT)
Dept: LAB | Facility: HOSPITAL | Age: 55
End: 2025-03-27
Payer: COMMERCIAL

## 2025-03-27 ENCOUNTER — OFFICE VISIT (OUTPATIENT)
Dept: INTERNAL MEDICINE | Facility: CLINIC | Age: 55
End: 2025-03-27
Payer: COMMERCIAL

## 2025-03-27 VITALS
TEMPERATURE: 97.3 F | BODY MASS INDEX: 24.78 KG/M2 | HEART RATE: 68 BPM | SYSTOLIC BLOOD PRESSURE: 152 MMHG | DIASTOLIC BLOOD PRESSURE: 102 MMHG | HEIGHT: 71 IN | WEIGHT: 177 LBS

## 2025-03-27 DIAGNOSIS — K21.9 GERD WITHOUT ESOPHAGITIS: ICD-10-CM

## 2025-03-27 DIAGNOSIS — J45.30 MILD PERSISTENT ASTHMA WITHOUT COMPLICATION: Chronic | ICD-10-CM

## 2025-03-27 DIAGNOSIS — E78.00 HYPERCHOLESTEROLEMIA: ICD-10-CM

## 2025-03-27 DIAGNOSIS — I10 BENIGN ESSENTIAL HYPERTENSION: ICD-10-CM

## 2025-03-27 DIAGNOSIS — Z12.5 PROSTATE CANCER SCREENING: ICD-10-CM

## 2025-03-27 DIAGNOSIS — E55.9 VITAMIN D DEFICIENCY: ICD-10-CM

## 2025-03-27 DIAGNOSIS — J84.9 INTERSTITIAL LUNG DISEASE: Chronic | ICD-10-CM

## 2025-03-27 DIAGNOSIS — R73.09 ABNORMAL GLUCOSE: ICD-10-CM

## 2025-03-27 DIAGNOSIS — F43.21 SITUATIONAL DEPRESSION: Chronic | ICD-10-CM

## 2025-03-27 DIAGNOSIS — I10 BENIGN ESSENTIAL HYPERTENSION: Primary | ICD-10-CM

## 2025-03-27 DIAGNOSIS — Z12.11 COLON CANCER SCREENING: ICD-10-CM

## 2025-03-27 DIAGNOSIS — J30.2 PERENNIAL ALLERGIC RHINITIS WITH SEASONAL VARIATION: ICD-10-CM

## 2025-03-27 DIAGNOSIS — J30.89 PERENNIAL ALLERGIC RHINITIS WITH SEASONAL VARIATION: ICD-10-CM

## 2025-03-27 LAB
25(OH)D3 SERPL-MCNC: 29.2 NG/ML (ref 30–100)
ALBUMIN SERPL-MCNC: 4.5 G/DL (ref 3.5–5.2)
ALBUMIN UR-MCNC: <1.2 MG/DL
ALBUMIN/GLOB SERPL: 1.6 G/DL
ALP SERPL-CCNC: 113 U/L (ref 39–117)
ALT SERPL W P-5'-P-CCNC: 21 U/L (ref 1–41)
ANION GAP SERPL CALCULATED.3IONS-SCNC: 14.1 MMOL/L (ref 5–15)
AST SERPL-CCNC: 22 U/L (ref 1–40)
BACTERIA UR QL AUTO: NORMAL /HPF
BASOPHILS # BLD AUTO: 0.02 10*3/MM3 (ref 0–0.2)
BASOPHILS NFR BLD AUTO: 0.4 % (ref 0–1.5)
BILIRUB SERPL-MCNC: 0.7 MG/DL (ref 0–1.2)
BILIRUB UR QL STRIP: NEGATIVE
BUN SERPL-MCNC: 15 MG/DL (ref 6–20)
BUN/CREAT SERPL: 13.6 (ref 7–25)
CALCIUM SPEC-SCNC: 9.4 MG/DL (ref 8.6–10.5)
CHLORIDE SERPL-SCNC: 101 MMOL/L (ref 98–107)
CHOLEST SERPL-MCNC: 226 MG/DL (ref 0–200)
CLARITY UR: CLEAR
CO2 SERPL-SCNC: 23.9 MMOL/L (ref 22–29)
COLOR UR: YELLOW
CREAT SERPL-MCNC: 1.1 MG/DL (ref 0.76–1.27)
CREAT UR-MCNC: 137.8 MG/DL
DEPRECATED RDW RBC AUTO: 36.5 FL (ref 37–54)
EGFRCR SERPLBLD CKD-EPI 2021: 79.3 ML/MIN/1.73
EOSINOPHIL # BLD AUTO: 0.02 10*3/MM3 (ref 0–0.4)
EOSINOPHIL NFR BLD AUTO: 0.4 % (ref 0.3–6.2)
ERYTHROCYTE [DISTWIDTH] IN BLOOD BY AUTOMATED COUNT: 12.4 % (ref 12.3–15.4)
GLOBULIN UR ELPH-MCNC: 2.8 GM/DL
GLUCOSE SERPL-MCNC: 95 MG/DL (ref 65–99)
GLUCOSE UR STRIP-MCNC: NEGATIVE MG/DL
HCT VFR BLD AUTO: 47.6 % (ref 37.5–51)
HDLC SERPL-MCNC: 52 MG/DL (ref 40–60)
HGB BLD-MCNC: 16.7 G/DL (ref 13–17.7)
HGB UR QL STRIP.AUTO: NEGATIVE
HYALINE CASTS UR QL AUTO: NORMAL /LPF
IMM GRANULOCYTES # BLD AUTO: 0.02 10*3/MM3 (ref 0–0.05)
IMM GRANULOCYTES NFR BLD AUTO: 0.4 % (ref 0–0.5)
KETONES UR QL STRIP: NEGATIVE
LDLC SERPL CALC-MCNC: 155 MG/DL (ref 0–100)
LDLC/HDLC SERPL: 2.93 {RATIO}
LEUKOCYTE ESTERASE UR QL STRIP.AUTO: NEGATIVE
LYMPHOCYTES # BLD AUTO: 1.27 10*3/MM3 (ref 0.7–3.1)
LYMPHOCYTES NFR BLD AUTO: 27.3 % (ref 19.6–45.3)
MCH RBC QN AUTO: 28.7 PG (ref 26.6–33)
MCHC RBC AUTO-ENTMCNC: 35.1 G/DL (ref 31.5–35.7)
MCV RBC AUTO: 81.9 FL (ref 79–97)
MICROALBUMIN/CREAT UR: NORMAL MG/G{CREAT}
MONOCYTES # BLD AUTO: 0.32 10*3/MM3 (ref 0.1–0.9)
MONOCYTES NFR BLD AUTO: 6.9 % (ref 5–12)
NEUTROPHILS NFR BLD AUTO: 3.01 10*3/MM3 (ref 1.7–7)
NEUTROPHILS NFR BLD AUTO: 64.6 % (ref 42.7–76)
NITRITE UR QL STRIP: NEGATIVE
NRBC BLD AUTO-RTO: 0 /100 WBC (ref 0–0.2)
PH UR STRIP.AUTO: 6 [PH] (ref 5–8)
PLATELET # BLD AUTO: 231 10*3/MM3 (ref 140–450)
PMV BLD AUTO: 11.5 FL (ref 6–12)
POTASSIUM SERPL-SCNC: 4.1 MMOL/L (ref 3.5–5.2)
PROT SERPL-MCNC: 7.3 G/DL (ref 6–8.5)
PROT UR QL STRIP: NEGATIVE
PSA SERPL-MCNC: 0.56 NG/ML (ref 0–4)
RBC # BLD AUTO: 5.81 10*6/MM3 (ref 4.14–5.8)
RBC # UR STRIP: NORMAL /HPF
REF LAB TEST METHOD: NORMAL
SODIUM SERPL-SCNC: 139 MMOL/L (ref 136–145)
SP GR UR STRIP: 1.02 (ref 1–1.03)
SQUAMOUS #/AREA URNS HPF: NORMAL /HPF
TRIGL SERPL-MCNC: 107 MG/DL (ref 0–150)
TSH SERPL DL<=0.05 MIU/L-ACNC: 0.75 UIU/ML (ref 0.27–4.2)
UROBILINOGEN UR QL STRIP: NORMAL
VIT B12 BLD-MCNC: 424 PG/ML (ref 211–946)
VLDLC SERPL-MCNC: 19 MG/DL (ref 5–40)
WBC # UR STRIP: NORMAL /HPF
WBC NRBC COR # BLD AUTO: 4.66 10*3/MM3 (ref 3.4–10.8)

## 2025-03-27 PROCEDURE — 81001 URINALYSIS AUTO W/SCOPE: CPT

## 2025-03-27 PROCEDURE — 80050 GENERAL HEALTH PANEL: CPT

## 2025-03-27 PROCEDURE — 82043 UR ALBUMIN QUANTITATIVE: CPT

## 2025-03-27 PROCEDURE — 82306 VITAMIN D 25 HYDROXY: CPT

## 2025-03-27 PROCEDURE — 80061 LIPID PANEL: CPT

## 2025-03-27 PROCEDURE — 82570 ASSAY OF URINE CREATININE: CPT

## 2025-03-27 PROCEDURE — 82607 VITAMIN B-12: CPT

## 2025-03-27 PROCEDURE — G0103 PSA SCREENING: HCPCS

## 2025-03-27 RX ORDER — TRIAMCINOLONE ACETONIDE 55 UG/1
2 SPRAY, METERED NASAL DAILY
Qty: 16.9 G | Refills: 11 | Status: SHIPPED | OUTPATIENT
Start: 2025-03-27 | End: 2026-03-27

## 2025-03-27 RX ORDER — OLMESARTAN MEDOXOMIL 40 MG/1
40 TABLET ORAL DAILY
Qty: 90 TABLET | Refills: 3 | Status: SHIPPED | OUTPATIENT
Start: 2025-03-27

## 2025-03-27 RX ORDER — BUDESONIDE AND FORMOTEROL FUMARATE DIHYDRATE 160; 4.5 UG/1; UG/1
2 AEROSOL RESPIRATORY (INHALATION)
Qty: 10.2 G | Refills: 12 | Status: SHIPPED | OUTPATIENT
Start: 2025-03-27

## 2025-03-27 RX ORDER — CHLORTHALIDONE 25 MG/1
25 TABLET ORAL DAILY
Qty: 90 TABLET | Refills: 3 | Status: SHIPPED | OUTPATIENT
Start: 2025-03-27

## 2025-03-27 RX ORDER — DESVENLAFAXINE 25 MG/1
25 TABLET, EXTENDED RELEASE ORAL DAILY
Qty: 30 TABLET | Refills: 0 | Status: SHIPPED | OUTPATIENT
Start: 2025-03-27 | End: 2025-04-08 | Stop reason: SDUPTHER

## 2025-03-27 RX ORDER — FAMOTIDINE 40 MG/1
40 TABLET, FILM COATED ORAL 2 TIMES DAILY
Qty: 60 TABLET | Refills: 5 | Status: SHIPPED | OUTPATIENT
Start: 2025-03-27

## 2025-03-27 NOTE — PROGRESS NOTES
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    Hypercholesterolemia.  Aerobic exercise will be incorporated into the daily routine. The current diet, low in fats and carbohydrates, will be maintained.    Interstitial lung disease.  Regular follow-ups with Dr. Daugherty will continue. The Nucala injection will be administered every 30 days.    He will continue getting a flu shot every fall.  He is up-to-date on Pneumovax.      Follow-up  The patient will follow up in 3 weeks.          Follow Up   Return in about 3 weeks (around 4/17/2025) for Recheck.  Patient was given instructions and counseling regarding his condition or for health maintenance advice. Please see specific information pulled into the AVS if appropriate.     Note: Part of this note may be an electronic transcription/translation of spoken language to printed text using the Dragon Dictation System.     Chel Blakely MD  Patient or patient representative verbalized consent for the use of Ambient Listening during the visit with  Chel Blakely MD for chart documentation. 4/7/2025  19:13 EDT

## 2025-04-04 ENCOUNTER — PATIENT MESSAGE (OUTPATIENT)
Dept: INTERNAL MEDICINE | Facility: CLINIC | Age: 55
End: 2025-04-04
Payer: COMMERCIAL

## 2025-04-07 RX ORDER — DESVENLAFAXINE 25 MG/1
25 TABLET, EXTENDED RELEASE ORAL DAILY
Qty: 30 TABLET | Refills: 0 | Status: CANCELLED | OUTPATIENT
Start: 2025-04-07

## 2025-04-07 RX ORDER — VITAMIN B COMPLEX
1 CAPSULE ORAL DAILY
Qty: 30 CAPSULE | Refills: 11
Start: 2025-04-07 | End: 2026-04-07

## 2025-04-07 NOTE — PATIENT INSTRUCTIONS
Patient Instructions  Problem List Items Addressed This Visit          Allergies and Adverse Reactions    Perennial allergic rhinitis with seasonal variation    Overview   Sees Dr. Mackenzie Rousseau, allergist at .          Relevant Orders    Ambulatory Referral to Allergy (Completed)       Cardiac and Vasculature    Hypercholesterolemia    Overview   He will continue to decrease fats and sugar in the diet and will continue to increase physical activity and exercise.           Relevant Orders    Lipid Panel    TSH    Vitamin B12    Benign essential hypertension - Primary    Overview   Takes amlodipine 2.5mg daily and olmesartan 40mg daily.            Relevant Medications    olmesartan (BENICAR) 40 MG tablet    chlorthalidone (HYGROTON) 25 MG tablet    Other Relevant Orders    CBC & Differential    Comprehensive Metabolic Panel    Microalbumin / Creatinine Urine Ratio - Urine, Clean Catch    Urinalysis With Microscopic - Urine, Clean Catch    ECG 12 Lead       Endocrine and Metabolic    Abnormal glucose       Gastrointestinal Abdominal     GERD without esophagitis    Overview   Takes famotidine 40 mg once or twice daily.         Relevant Medications    famotidine (PEPCID) 40 MG tablet    Other Relevant Orders    Ambulatory referral for Screening EGD (Completed)       Pulmonary and Pneumonias    Mild persistent asthma (Chronic)    Overview   Seeing Dr. Kumari  Nucala( mepolizumab) injection every 30 days.  Symbicort inhaler 2 times a day. albuterol as needed.           Relevant Medications    Mepolizumab 100 MG/ML solution auto-injector    albuterol sulfate HFA (ProAir HFA) 108 (90 Base) MCG/ACT inhaler    budesonide-formoterol (Symbicort) 160-4.5 MCG/ACT inhaler    Interstitial lung disease (Chronic)    Overview   Seeing Dr. Kumari who presented case in multidisciplinary ILD conference with pulmonology, rheumatology, pathology and radiology. 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  In context of pathological rib fracture, he recommended treatment with nucala(mepolizumab).  He will monitor chest CT scans. He recommended stopping Singulair since there can be association of it with EGPA.  Nucala will also help control his asthma and allergic rhinitis.          Relevant Medications    Mepolizumab 100 MG/ML solution auto-injector    cetirizine (zyrTEC) 10 MG tablet    albuterol sulfate HFA (ProAir HFA) 108 (90 Base) MCG/ACT inhaler    Triamcinolone Acetonide (NASACORT) 55 MCG/ACT nasal inhaler    budesonide-formoterol (Symbicort) 160-4.5 MCG/ACT inhaler     Other Visit Diagnoses         Prostate cancer screening        Relevant Orders    PSA Screen      Colon cancer screening        Relevant Orders    Ambulatory Referral For Screening Colonoscopy (Completed)      Vitamin D deficiency        Relevant Orders    Vitamin D,25-Hydroxy            Exercising to Stay Healthy  To become healthy and stay healthy, it is recommended that you do moderate-intensity and vigorous-intensity exercise. You can tell that you are exercising at a moderate intensity if your heart starts beating faster and you start breathing faster but can still hold a conversation. You can tell that you are exercising at a vigorous intensity if you are breathing much harder and faster and cannot hold a conversation while exercising.  How can exercise benefit me?  Exercising regularly is important. It has many health benefits, such as:  Improving overall fitness, flexibility, and endurance.  Increasing bone density.  Helping with weight control.  Decreasing body fat.  Increasing muscle strength and endurance.  Reducing stress and tension, anxiety, depression, or anger.  Improving overall health.  What guidelines should I follow while exercising?  Before you start a new exercise program, talk with your health care provider.  Do not exercise so much that you hurt yourself, feel dizzy, or get very  short of breath.  Wear comfortable clothes and wear shoes with good support.  Drink plenty of water while you exercise to prevent dehydration or heat stroke.  Work out until your breathing and your heartbeat get faster (moderate intensity).  How often should I exercise?  Choose an activity that you enjoy, and set realistic goals. Your health care provider can help you make an activity plan that is individually designed and works best for you.  Exercise regularly as told by your health care provider. This may include:  Doing strength training two times a week, such as:  Lifting weights.  Using resistance bands.  Push-ups.  Sit-ups.  Yoga.  Doing a certain intensity of exercise for a given amount of time. Choose from these options:  A total of 150 minutes of moderate-intensity exercise every week.  A total of 75 minutes of vigorous-intensity exercise every week.  A mix of moderate-intensity and vigorous-intensity exercise every week.  Children, pregnant women, people who have not exercised regularly, people who are overweight, and older adults may need to talk with a health care provider about what activities are safe to perform. If you have a medical condition, be sure to talk with your health care provider before you start a new exercise program.  What are some exercise ideas?  Moderate-intensity exercise ideas include:  Walking 1 mile (1.6 km) in about 15 minutes.  Biking.  Hiking.  Golfing.  Dancing.  Water aerobics.  Vigorous-intensity exercise ideas include:  Walking 4.5 miles (7.2 km) or more in about 1 hour.  Jogging or running 5 miles (8 km) in about 1 hour.  Biking 10 miles (16.1 km) or more in about 1 hour.  Lap swimming.  Roller-skating or in-line skating.  Cross-country skiing.  Vigorous competitive sports, such as football, basketball, and soccer.  Jumping rope.  Aerobic dancing.  What are some everyday activities that can help me get exercise?  Yard work, such as:  Pushing a .  Raking and  bagging leaves.  Washing your car.  Pushing a stroller.  Shoveling snow.  Gardening.  Washing windows or floors.  How can I be more active in my day-to-day activities?  Use stairs instead of an elevator.  Take a walk during your lunch break.  If you drive, park your car farther away from your work or school.  If you take public transportation, get off one stop early and walk the rest of the way.  Stand up or walk around during all of your indoor phone calls.  Get up, stretch, and walk around every 30 minutes throughout the day.  Enjoy exercise with a friend. Support to continue exercising will help you keep a regular routine of activity.  Where to find more information  You can find more information about exercising to stay healthy from:  [place] Department of Health and Human Services: www.hhs.gov  Centers for Disease Control and Prevention (CDC): www.cdc.gov  Summary  Exercising regularly is important. It will improve your overall fitness, flexibility, and endurance.  Regular exercise will also improve your overall health. It can help you control your weight, reduce stress, and improve your bone density.  Do not exercise so much that you hurt yourself, feel dizzy, or get very short of breath.  Before you start a new exercise program, talk with your health care provider.  This information is not intended to replace advice given to you by your health care provider. Make sure you discuss any questions you have with your health care provider.  Document Revised: 04/15/2022 Document Reviewed: 04/15/2022  Elsevier Patient Education © 2023 [employer]

## 2025-04-08 RX ORDER — DESVENLAFAXINE 25 MG/1
25 TABLET, EXTENDED RELEASE ORAL DAILY
Qty: 30 TABLET | Refills: 0 | Status: SHIPPED | OUTPATIENT
Start: 2025-04-08 | End: 2025-04-09 | Stop reason: SDUPTHER

## 2025-04-08 NOTE — TELEPHONE ENCOUNTER
Rx Refill Note  Requested Prescriptions     Pending Prescriptions Disp Refills    Desvenlafaxine Succinate ER 25 MG tablet sustained-release 24 hour 30 tablet 0     Sig: Take 1 tablet by mouth Daily.      Last office visit with prescribing clinician: 3/27/2025   Last telemedicine visit with prescribing clinician: Visit date not found   Next office visit with prescribing clinician: 5/15/2025                         Would you like a call back once the refill request has been completed: [] Yes [] No    If the office needs to give you a call back, can they leave a voicemail: [] Yes [] No    Erika Grace MA  04/08/25, 09:09 EDT   show

## 2025-04-09 ENCOUNTER — OFFICE VISIT (OUTPATIENT)
Dept: INTERNAL MEDICINE | Facility: CLINIC | Age: 55
End: 2025-04-09
Payer: COMMERCIAL

## 2025-04-09 VITALS
HEIGHT: 71 IN | SYSTOLIC BLOOD PRESSURE: 120 MMHG | DIASTOLIC BLOOD PRESSURE: 80 MMHG | BODY MASS INDEX: 24.64 KG/M2 | WEIGHT: 176 LBS | OXYGEN SATURATION: 98 % | TEMPERATURE: 98.4 F | HEART RATE: 78 BPM

## 2025-04-09 DIAGNOSIS — R52 GENERALIZED BODY ACHES: Primary | ICD-10-CM

## 2025-04-09 PROCEDURE — 87428 SARSCOV & INF VIR A&B AG IA: CPT | Performed by: STUDENT IN AN ORGANIZED HEALTH CARE EDUCATION/TRAINING PROGRAM

## 2025-04-09 PROCEDURE — 99214 OFFICE O/P EST MOD 30 MIN: CPT | Performed by: STUDENT IN AN ORGANIZED HEALTH CARE EDUCATION/TRAINING PROGRAM

## 2025-04-09 RX ORDER — DESVENLAFAXINE 25 MG/1
25 TABLET, EXTENDED RELEASE ORAL DAILY
Qty: 30 TABLET | Refills: 0 | Status: SHIPPED | OUTPATIENT
Start: 2025-04-09
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
## 2025-04-15 ENCOUNTER — OFFICE VISIT (OUTPATIENT)
Dept: INTERNAL MEDICINE | Facility: CLINIC | Age: 55
End: 2025-04-15
Payer: COMMERCIAL

## 2025-04-15 VITALS
TEMPERATURE: 98.2 F | HEIGHT: 71 IN | SYSTOLIC BLOOD PRESSURE: 142 MMHG | BODY MASS INDEX: 24.64 KG/M2 | WEIGHT: 176 LBS | HEART RATE: 68 BPM | DIASTOLIC BLOOD PRESSURE: 94 MMHG

## 2025-04-15 DIAGNOSIS — R39.198 DIFFICULTY URINATING: Primary | ICD-10-CM

## 2025-04-15 DIAGNOSIS — R82.90 ABNORMAL FINDING IN URINE: ICD-10-CM

## 2025-04-15 LAB
BILIRUB BLD-MCNC: NEGATIVE MG/DL
CLARITY, POC: CLEAR
COLOR UR: YELLOW
EXPIRATION DATE: ABNORMAL
GLUCOSE UR STRIP-MCNC: NEGATIVE MG/DL
KETONES UR QL: NEGATIVE
LEUKOCYTE EST, POC: ABNORMAL
Lab: ABNORMAL
NITRITE UR-MCNC: NEGATIVE MG/ML
PH UR: 5.5 [PH] (ref 5–8)
PROT UR STRIP-MCNC: NEGATIVE MG/DL
RBC # UR STRIP: NEGATIVE /UL
SP GR UR: 1.01 (ref 1–1.03)
UROBILINOGEN UR QL: ABNORMAL

## 2025-04-15 PROCEDURE — 87086 URINE CULTURE/COLONY COUNT: CPT

## 2025-04-15 PROCEDURE — 81003 URINALYSIS AUTO W/O SCOPE: CPT

## 2025-04-15 PROCEDURE — 99214 OFFICE O/P EST MOD 30 MIN: CPT

## 2025-04-15 RX ORDER — NITROFURANTOIN 25; 75 MG/1; MG/1
100 CAPSULE ORAL 2 TIMES DAILY
Qty: 14 CAPSULE | Refills: 0 | Status: SHIPPED | OUTPATIENT
Start: 2025-04-15 | End: 2025-04-22
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
## 2025-04-17 LAB — BACTERIA SPEC AEROBE CULT: NO GROWTH

## 2025-04-18 RX ORDER — TAMSULOSIN HYDROCHLORIDE 0.4 MG/1
1 CAPSULE ORAL NIGHTLY
Qty: 30 CAPSULE | Refills: 5 | Status: SHIPPED | OUTPATIENT
Start: 2025-04-18

## 2025-04-21 DIAGNOSIS — R30.0 DYSURIA: Primary | ICD-10-CM

## 2025-05-07 RX ORDER — DESVENLAFAXINE 25 MG/1
25 TABLET, EXTENDED RELEASE ORAL DAILY
Qty: 90 TABLET | Refills: 3 | Status: SHIPPED | OUTPATIENT
Start: 2025-05-07

## 2025-05-15 ENCOUNTER — OFFICE VISIT (OUTPATIENT)
Dept: INTERNAL MEDICINE | Facility: CLINIC | Age: 55
End: 2025-05-15
Payer: COMMERCIAL

## 2025-05-15 VITALS
DIASTOLIC BLOOD PRESSURE: 90 MMHG | HEIGHT: 71 IN | BODY MASS INDEX: 24.95 KG/M2 | WEIGHT: 178.2 LBS | SYSTOLIC BLOOD PRESSURE: 144 MMHG | OXYGEN SATURATION: 96 % | TEMPERATURE: 98 F | HEART RATE: 70 BPM

## 2025-05-15 DIAGNOSIS — R30.0 DYSURIA: Primary | Chronic | ICD-10-CM

## 2025-05-15 DIAGNOSIS — I10 BENIGN ESSENTIAL HYPERTENSION: ICD-10-CM

## 2025-05-15 DIAGNOSIS — N41.0 ACUTE PROSTATITIS: ICD-10-CM

## 2025-05-15 DIAGNOSIS — R06.02 SHORTNESS OF BREATH ON EXERTION: ICD-10-CM

## 2025-05-15 PROCEDURE — 99214 OFFICE O/P EST MOD 30 MIN: CPT | Performed by: INTERNAL MEDICINE

## 2025-05-15 RX ORDER — BECLOMETHASONE DIPROPIONATE HFA 40 UG/1
2 AEROSOL, METERED RESPIRATORY (INHALATION)
Qty: 10.6 G | Refills: 11 | Status: SHIPPED | OUTPATIENT
Start: 2025-05-15

## 2025-05-15 RX ORDER — NIFEDIPINE 30 MG/1
30 TABLET, EXTENDED RELEASE ORAL DAILY
Qty: 30 TABLET | Refills: 5 | Status: SHIPPED | OUTPATIENT
Start: 2025-05-15

## 2025-05-15 NOTE — PATIENT INSTRUCTIONS
Problem List Items Addressed This Visit          Cardiac and Vasculature    Benign essential hypertension    Overview   Takes  olmesartan 40mg daily. He did not try chorthalidone.            Relevant Medications    olmesartan (BENICAR) 40 MG tablet    NIFEdipine XL (PROCARDIA XL) 30 MG 24 hr tablet    Shortness of breath on exertion       Genitourinary and Reproductive     Dysuria - Primary (Chronic)    Overview   Symptoms began about 7 April.  Treated with Macrobid on 4/15/25 for possible UTI.  Urinalysis showed only 6-10 white blood cells.  Urine culture did not grow any pathological bacteria.  Pain with urination gradually improved.  He has not had any pain in the last couple days.  Pain could possibly have been related to prostatitis although his prostate is not tender on exam today.  Pain could also be related to passing small kidney stones but the urinalysis did not show any blood and the patient has not noticed any blood in his urine.         Acute prostatitis     Exercising to Stay Healthy  To become healthy and stay healthy, it is recommended that you do moderate-intensity and vigorous-intensity exercise. You can tell that you are exercising at a moderate intensity if your heart starts beating faster and you start breathing faster but can still hold a conversation. You can tell that you are exercising at a vigorous intensity if you are breathing much harder and faster and cannot hold a conversation while exercising.  How can exercise benefit me?  Exercising regularly is important. It has many health benefits, such as:  Improving overall fitness, flexibility, and endurance.  Increasing bone density.  Helping with weight control.  Decreasing body fat.  Increasing muscle strength and endurance.  Reducing stress and tension, anxiety, depression, or anger.  Improving overall health.  What guidelines should I follow while exercising?  Before you start a new exercise program, talk with your health care  provider.  Do not exercise so much that you hurt yourself, feel dizzy, or get very short of breath.  Wear comfortable clothes and wear shoes with good support.  Drink plenty of water while you exercise to prevent dehydration or heat stroke.  Work out until your breathing and your heartbeat get faster (moderate intensity).  How often should I exercise?  Choose an activity that you enjoy, and set realistic goals. Your health care provider can help you make an activity plan that is individually designed and works best for you.  Exercise regularly as told by your health care provider. This may include:  Doing strength training two times a week, such as:  Lifting weights.  Using resistance bands.  Push-ups.  Sit-ups.  Yoga.  Doing a certain intensity of exercise for a given amount of time. Choose from these options:  A total of 150 minutes of moderate-intensity exercise every week.  A total of 75 minutes of vigorous-intensity exercise every week.  A mix of moderate-intensity and vigorous-intensity exercise every week.  Children, pregnant women, people who have not exercised regularly, people who are overweight, and older adults may need to talk with a health care provider about what activities are safe to perform. If you have a medical condition, be sure to talk with your health care provider before you start a new exercise program.  What are some exercise ideas?  Moderate-intensity exercise ideas include:  Walking 1 mile (1.6 km) in about 15 minutes.  Biking.  Hiking.  Golfing.  Dancing.  Water aerobics.  Vigorous-intensity exercise ideas include:  Walking 4.5 miles (7.2 km) or more in about 1 hour.  Jogging or running 5 miles (8 km) in about 1 hour.  Biking 10 miles (16.1 km) or more in about 1 hour.  Lap swimming.  Roller-skating or in-line skating.  Cross-country skiing.  Vigorous competitive sports, such as football, basketball, and soccer.  Jumping rope.  Aerobic dancing.  What are some everyday activities that can  help me get exercise?  Yard work, such as:  Pushing a .  Raking and bagging leaves.  Washing your car.  Pushing a stroller.  Shoveling snow.  Gardening.  Washing windows or floors.  How can I be more active in my day-to-day activities?  Use stairs instead of an elevator.  Take a walk during your lunch break.  If you drive, park your car farther away from your work or school.  If you take public transportation, get off one stop early and walk the rest of the way.  Stand up or walk around during all of your indoor phone calls.  Get up, stretch, and walk around every 30 minutes throughout the day.  Enjoy exercise with a friend. Support to continue exercising will help you keep a regular routine of activity.  Where to find more information  You can find more information about exercising to stay healthy from:  [place] Department of Health and Human Services: www.hhs.gov  Centers for Disease Control and Prevention (CDC): www.cdc.gov  Summary  Exercising regularly is important. It will improve your overall fitness, flexibility, and endurance.  Regular exercise will also improve your overall health. It can help you control your weight, reduce stress, and improve your bone density.  Do not exercise so much that you hurt yourself, feel dizzy, or get very short of breath.  Before you start a new exercise program, talk with your health care provider.  This information is not intended to replace advice given to you by your health care provider. Make sure you discuss any questions you have with your health care provider.  Document Revised: 04/15/2022 Document Reviewed: 04/15/2022  Elsevier Patient Education © 2023 [employer]
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
## 2025-06-30 RX ORDER — ALBUTEROL SULFATE 90 UG/1
2 INHALANT RESPIRATORY (INHALATION) EVERY 4 HOURS PRN
Qty: 18 G | Refills: 5 | Status: SHIPPED | OUTPATIENT
Start: 2025-06-30

## 2025-07-11 RX ORDER — OLMESARTAN MEDOXOMIL 40 MG/1
40 TABLET ORAL DAILY
Qty: 90 TABLET | Refills: 3 | Status: SHIPPED | OUTPATIENT
Start: 2025-07-11

## 2025-07-11 NOTE — TELEPHONE ENCOUNTER
Rx Refill Note  Requested Prescriptions     Pending Prescriptions Disp Refills    olmesartan (BENICAR) 40 MG tablet 90 tablet 3     Sig: Take 1 tablet by mouth Daily.      Last office visit with prescribing clinician: 5/15/2025   Last telemedicine visit with prescribing clinician: Visit date not found   Next office visit with prescribing clinician: Visit date not found                         Would you like a call back once the refill request has been completed: [] Yes [] No    If the office needs to give you a call back, can they leave a voicemail: [] Yes [] No    Karoline Horton MA  07/11/25, 11:52 EDT

## 2025-07-21 RX ORDER — OLMESARTAN MEDOXOMIL 40 MG/1
40 TABLET ORAL DAILY
Qty: 90 TABLET | Refills: 3 | Status: CANCELLED | OUTPATIENT
Start: 2025-07-21

## 2025-07-22 RX ORDER — FAMOTIDINE 40 MG/1
40 TABLET, FILM COATED ORAL 2 TIMES DAILY
Qty: 60 TABLET | Refills: 5 | Status: SHIPPED | OUTPATIENT
Start: 2025-07-22

## 2025-07-22 NOTE — TELEPHONE ENCOUNTER
Rx Refill Note  Requested Prescriptions     Pending Prescriptions Disp Refills    famotidine (PEPCID) 40 MG tablet 60 tablet 5     Sig: Take 1 tablet by mouth 2 (Two) Times a Day.    olmesartan (BENICAR) 40 MG tablet 90 tablet 3     Sig: Take 1 tablet by mouth Daily.      Last office visit with prescribing clinician: 5/15/2025   Last telemedicine visit with prescribing clinician: Visit date not found   Next office visit with prescribing clinician: Visit date not found                         Would you like a call back once the refill request has been completed: [] Yes [] No    If the office needs to give you a call back, can they leave a voicemail: [] Yes [] No    Aleena Dsouza LPN  07/22/25, 08:23 EDT

## 2025-07-28 ENCOUNTER — OFFICE VISIT (OUTPATIENT)
Dept: INTERNAL MEDICINE | Facility: CLINIC | Age: 55
End: 2025-07-28
Payer: COMMERCIAL

## 2025-07-28 VITALS
HEART RATE: 64 BPM | SYSTOLIC BLOOD PRESSURE: 128 MMHG | DIASTOLIC BLOOD PRESSURE: 96 MMHG | TEMPERATURE: 98 F | BODY MASS INDEX: 24.36 KG/M2 | WEIGHT: 174 LBS | HEIGHT: 71 IN

## 2025-07-28 DIAGNOSIS — J01.90 ACUTE NON-RECURRENT SINUSITIS, UNSPECIFIED LOCATION: ICD-10-CM

## 2025-07-28 DIAGNOSIS — T78.40XD ALLERGY, SUBSEQUENT ENCOUNTER: Primary | ICD-10-CM

## 2025-07-28 PROCEDURE — 99213 OFFICE O/P EST LOW 20 MIN: CPT

## 2025-07-28 RX ORDER — FLUTICASONE PROPIONATE 50 MCG
2 SPRAY, SUSPENSION (ML) NASAL DAILY
Qty: 16 G | Refills: 0 | Status: SHIPPED | OUTPATIENT
Start: 2025-07-28

## 2025-07-28 RX ORDER — AZITHROMYCIN 250 MG/1
TABLET, FILM COATED ORAL
Qty: 6 TABLET | Refills: 0 | Status: SHIPPED | OUTPATIENT
Start: 2025-07-28 | End: 2025-08-04

## 2025-07-28 NOTE — PROGRESS NOTES
Acute Office Visit      Date: 2025   Patient Name: Perfecto Garcia  : 1970   MRN: [id number]     Chief Complaint:    Chief Complaint   Patient presents with    Sinus Problem     Nasal congestion, dry cough, sinus pressure, and drainage x 3 weeks      History of Present Illness  The patient is a 54-year-old male who presents today with congestion and a dry cough.    He has been experiencing nasal congestion, a dry cough, sinus pressure, and drainage for the past 3 weeks. He has a history of sinus issues and nasal polyps. In the mornings, he experiences significant congestion, which he can partially alleviate by blowing his nose. The discharge is yellow, leading him to suspect a sinus infection. His symptoms improve during the day but worsen in the evening, with thick mucus that is painful to expel. He mentions using a neti pot regularly, which he finds helpful. He takes a regular allergy pill.    He has a history of acute sinusitis and has undergone surgery for nasal polyps, which provided significant relief. However, he feels that the polyps may be recurring. He has not seen his ENT specialist in several years.    He also reports a dry cough, which is not severe and often goes unnoticed unless he drinks coffee.        Subjective      I have reviewed the patients family history, social history, past medical history, past surgical history and have updated it as appropriate.     Medications:     Current Outpatient Medications:     albuterol sulfate HFA (ProAir HFA) 108 (90 Base) MCG/ACT inhaler, Inhale 2 puffs Every 4 (Four) Hours As Needed for Wheezing., Disp: 18 g, Rfl: 5    Beclomethasone Diprop HFA (Qvar RediHaler) 40 MCG/ACT inhaler, Inhale 2 puffs 2 (Two) Times a Day. (Patient taking differently: Inhale 2 puffs 2 (Two) Times a Day As Needed.), Disp: 10.6 g, Rfl: 11    budesonide-formoterol (Symbicort) 160-4.5 MCG/ACT inhaler, Inhale 2 puffs 2 (Two) Times a Day., Disp: 10.2 g, Rfl: 12     "cetirizine (zyrTEC) 10 MG tablet, Take 1 tablet by mouth Daily. (Patient taking differently: Take 1 tablet by mouth Daily As Needed.), Disp: 90 tablet, Rfl: 1    cholecalciferol (Vitamin D, Cholecalciferol,) 25 MCG (1000 UT) tablet, Take 2 tablets by mouth Daily., Disp: , Rfl:     famotidine (PEPCID) 40 MG tablet, Take 1 tablet by mouth 2 (Two) Times a Day. (Patient taking differently: Take 1 tablet by mouth 2 (Two) Times a Day As Needed.), Disp: 60 tablet, Rfl: 5    Mepolizumab 100 MG/ML solution auto-injector, Inject 1 mL under the skin into the appropriate area as directed Every 30 (Thirty) Days., Disp: , Rfl:     olmesartan (BENICAR) 40 MG tablet, Take 1 tablet by mouth Daily., Disp: 90 tablet, Rfl: 3    b complex vitamins capsule, Take 1 capsule by mouth Daily. (Patient not taking: Reported on 7/28/2025), Disp: , Rfl:     Desvenlafaxine Succinate ER 25 MG tablet sustained-release 24 hour, Take 1 tablet by mouth Daily. (Patient not taking: Reported on 7/28/2025), Disp: 90 tablet, Rfl: 3    NIFEdipine XL (PROCARDIA XL) 30 MG 24 hr tablet, Take 1 tablet by mouth Daily. (Patient not taking: Reported on 7/28/2025), Disp: 30 tablet, Rfl: 5    Allergies:   Allergies   Allergen Reactions    Flomax [Tamsulosin] Dizziness    Triamcinolone Acetonide(Nasal) [Triamcinolone] Other (See Comments) and Headache     Nasal spray causes flu like sx    Augmentin [Amoxicillin-Pot Clavulanate] Rash    Mirtazapine Unknown (See Comments)     unknown    Other Other (See Comments)     No known drug allergies -     Venlafaxine Paresthesia and Other (See Comments)       Objective     Physical Exam: Please see above  Vital Signs:   Vitals:    07/28/25 0924   BP: 128/96   BP Location: Left arm   Patient Position: Sitting   Cuff Size: Adult   Pulse: 64   Temp: 98 °F (36.7 °C)   TempSrc: Temporal   Weight: 78.9 kg (174 lb)   Height: 181 cm (71.26\")   PainSc: 0-No pain     Body mass index is 24.09 kg/m².    Physical Exam  HENT:      Right Ear: " Tympanic membrane normal.      Left Ear: Tympanic membrane normal.      Nose: Nose normal.      Mouth/Throat:      Mouth: Mucous membranes are moist.         Procedures      Assessment / Plan      Assessment/Plan:   Problem List Items Addressed This Visit    None        Assessment & Plan  1. Sinusitis.  - Symptoms include nasal congestion, dry cough, sinus pressure, and drainage for 3 weeks. The presence of thick, yellow nasal discharge suggests a bacterial infection.  - No large polyps were observed in the nose, but some inflammation was noted.  - A Z-Enrico (azithromycin) will be prescribed for 5 days to treat the sinusitis.  - Flonase will be prescribed to help reduce inflammation, with instructions to use 2 sprays in each nostril. If symptoms do not improve within a week, further evaluation will be necessary.    2. Dry cough.  - The dry cough may be due to postnasal drip from the sinusitis.  - Improvement in sinus symptoms should alleviate the cough.  - No wheezing was detected during the examination.  - If the cough persists, further evaluation will be necessary.    3. Asthma.  - Asthma symptoms may be exacerbated by current sinusitis and allergies.  - No wheezing was detected during the examination.  - Continue current asthma management and monitor symptoms.  - If asthma symptoms worsen, further evaluation will be necessary.    4. Allergies.  - Allergies may be contributing to sinusitis. The patient is currently taking a regular allergy pill.  - Consider switching to a different antihistamine like Allegra if current allergy medication is not effective.  - Flonase will be prescribed to help reduce inflammation.    Follow-up: If symptoms do not improve within a week, further evaluation will be necessary. If asthma symptoms worsen, further evaluation will be necessary.    Follow Up:   No follow-ups on file.    Patient or patient representative verbalized consent for the use of Ambient Listening during the visit with   MARISELA Torres for chart documentation. 7/28/2025  11:03 EDT    MARISELA Torres  [facility] [address]  [facility]  [address] [facility] [address]  [facility] [id number]  Fax [phone]  Phone [phone]

## 2025-08-04 ENCOUNTER — OFFICE VISIT (OUTPATIENT)
Dept: INTERNAL MEDICINE | Facility: CLINIC | Age: 55
End: 2025-08-04
Payer: COMMERCIAL

## 2025-08-04 ENCOUNTER — TELEPHONE (OUTPATIENT)
Dept: INTERNAL MEDICINE | Facility: CLINIC | Age: 55
End: 2025-08-04
Payer: COMMERCIAL

## 2025-08-04 ENCOUNTER — HOSPITAL ENCOUNTER (OUTPATIENT)
Dept: GENERAL RADIOLOGY | Facility: HOSPITAL | Age: 55
Discharge: HOME | End: 2025-08-04
Payer: COMMERCIAL

## 2025-08-04 VITALS
OXYGEN SATURATION: 97 % | DIASTOLIC BLOOD PRESSURE: 90 MMHG | WEIGHT: 176 LBS | SYSTOLIC BLOOD PRESSURE: 130 MMHG | HEART RATE: 68 BPM | BODY MASS INDEX: 24.37 KG/M2 | TEMPERATURE: 98.4 F

## 2025-08-04 DIAGNOSIS — R05.1 ACUTE COUGH: ICD-10-CM

## 2025-08-04 DIAGNOSIS — R05.1 ACUTE COUGH: Primary | ICD-10-CM

## 2025-08-04 DIAGNOSIS — J45.901 MODERATE ASTHMA WITH EXACERBATION, UNSPECIFIED WHETHER PERSISTENT: ICD-10-CM

## 2025-08-04 PROCEDURE — 96372 THER/PROPH/DIAG INJ SC/IM: CPT

## 2025-08-04 PROCEDURE — 99214 OFFICE O/P EST MOD 30 MIN: CPT

## 2025-08-04 PROCEDURE — 71046 X-RAY EXAM CHEST 2 VIEWS: CPT

## 2025-08-04 RX ORDER — TRIAMCINOLONE ACETONIDE 40 MG/ML
40 INJECTION, SUSPENSION INTRA-ARTICULAR; INTRAMUSCULAR ONCE
Status: COMPLETED | OUTPATIENT
Start: 2025-08-04 | End: 2025-08-04

## 2025-08-04 RX ADMIN — TRIAMCINOLONE ACETONIDE 40 MG: 40 INJECTION, SUSPENSION INTRA-ARTICULAR; INTRAMUSCULAR at 11:27

## 2025-08-25 RX ORDER — CETIRIZINE HYDROCHLORIDE 10 MG/1
10 TABLET ORAL DAILY
Qty: 90 TABLET | Refills: 1 | Status: SHIPPED | OUTPATIENT
Start: 2025-08-25

## 2025-08-25 RX ORDER — OLMESARTAN MEDOXOMIL 40 MG/1
40 TABLET ORAL DAILY
Qty: 90 TABLET | Refills: 3 | Status: SHIPPED | OUTPATIENT
Start: 2025-08-25